# Patient Record
Sex: FEMALE | Race: WHITE | NOT HISPANIC OR LATINO | Employment: OTHER | URBAN - METROPOLITAN AREA
[De-identification: names, ages, dates, MRNs, and addresses within clinical notes are randomized per-mention and may not be internally consistent; named-entity substitution may affect disease eponyms.]

---

## 2021-05-24 ENCOUNTER — RECORDS - HEALTHEAST (OUTPATIENT)
Dept: ADMINISTRATIVE | Facility: CLINIC | Age: 61
End: 2021-05-24

## 2022-10-02 ENCOUNTER — HEALTH MAINTENANCE LETTER (OUTPATIENT)
Age: 62
End: 2022-10-02

## 2022-11-14 NOTE — PROGRESS NOTES
Chief Complaint   Patient presents with     Ent Problem     Patient has symptoms of GERD- hx of this and no improvement      History of Present Illness  Mallika Stuart is a 62 year old female who presents today for evaluation.  The patient reports a long history of issues with throat fullness, throat clearing, postnasal drainage.  She previously saw an ENT and was placed on pantoprazole with some results for a while.  She seems to feel like the problem is getting worse.  She has not seen a gastroenterologist or had an EGD in the past.  She is currently taking 20 mg of pantoprazole once daily 20-30 minutes prior to morning meal.  She is having breakthrough symptoms several times a week requiring Pepcid for treatment.  She does report fairly significant pill dysphagia that is quite bothersome.  She denies any odynophagia, pharyngodynia, otalgia, hemoptysis, voice change, neck lumps/bumps/swelling, no unintentional weight loss.  She is a lifetime non-smoker.  She denies any recent intubations or throat procedures.  She does have a remote history of allergy injection immunotherapy up until about the ninth grade.  She had allergy testing repeated about 12 years ago which was negative.  She does report some known hearing problems more so on the left-hand side.  History of ear tube placement adenoidectomy.    Past Medical History  There is no problem list on file for this patient.    Current Medications     Current Outpatient Medications:      albuterol (PROAIR HFA/PROVENTIL HFA/VENTOLIN HFA) 108 (90 Base) MCG/ACT inhaler, Inhale 2 puffs into the lungs, Disp: , Rfl:      aspirin (ASA) 81 MG EC tablet, Take 81 mg by mouth, Disp: , Rfl:      calcium citrate (CITRACAL) 950 (200 Ca) MG tablet, Take 1,900 mg by mouth, Disp: , Rfl:      famotidine (PEPCID) 40 MG tablet, Take 1 tablet (40 mg) by mouth At Bedtime, Disp: 90 tablet, Rfl: 3     fluticasone-salmeterol (ADVAIR) 250-50 MCG/ACT inhaler, , Disp: , Rfl:      levothyroxine  "(SYNTHROID/LEVOTHROID) 88 MCG tablet, Take 1 tablet by mouth daily, Disp: , Rfl:      metoprolol succinate ER (TOPROL XL) 200 MG 24 hr tablet, Take 200 mg by mouth, Disp: , Rfl:      omeprazole (PRILOSEC) 40 MG DR capsule, Take 1 capsule (40 mg) by mouth daily Take 20-30 minutes prior to morning meal, Disp: 90 capsule, Rfl: 1     rosuvastatin (CRESTOR) 20 MG tablet, Take 20 mg by mouth, Disp: , Rfl:      traZODone (DESYREL) 50 MG tablet, Take 1-2 tablets at bedtime as needed for insomnia, Disp: , Rfl:      Vitamin D3 (CHOLECALCIFEROL) 25 mcg (1000 units) tablet, Take 1 capsule by mouth, Disp: , Rfl:     Allergies  Allergies   Allergen Reactions     Hmg-Coa-R Inhibitors Muscle Pain (Myalgia)       Social History   Social History     Socioeconomic History     Marital status:        Family History  History reviewed. No pertinent family history.    Review of Systems  As per HPI and PMHx, otherwise 10+ comprehensive system review is negative.    Physical Exam  /82 (BP Location: Right arm, Patient Position: Sitting, Cuff Size: Adult Regular)   Pulse 74   Temp 98.5  F (36.9  C) (Tympanic)   Ht 1.626 m (5' 4\")   Wt 76.7 kg (169 lb)   BMI 29.01 kg/m    GENERAL: Patient is a pleasant, cooperative 62 year old female in no acute distress.  HEAD: Normocephalic, atraumatic.  Hair and scalp are normal.  EYES: Pupils are equal, round, reactive to light and accommodation.  Extraocular movements are intact.  The sclera nonicteric without injection.  The extraocular structures are normal.  EARS: See below.  NOSE: Nares are patent.  Nasal mucosa is pink and moist.  Negative anterior rhinoscopy.  ORAL CAVITY: Dentition is in good repair.  Mucous membranes are moist.  Tongue is mobile, protrudes to the midline.  Palate elevates symmetrically.  Small hard palate torus.  Tonsils are 1+, symmetric.  No erythema or exudate.  No oral cavity or oropharyngeal masses, lesions, ulcerations, leukoplakia.  NECK: Supple, trachea is " midline.  There no palpable cervical lymphadenopathy or masses bilaterally.  Palpation of the bilateral parotid and submandibular areas reveal no masses.  No thyromegaly.    NEUROLOGIC: Cranial nerves II through XII are grossly intact.  Voice is strong.  Patient is House-Brackmann I/VI bilaterally.  CARDIOVASCULAR: Extremities are warm and well-perfused.  No significant peripheral edema.  RESPIRATORY: Patient has nonlabored breathing without cough, wheeze, stridor.  PSYCHIATRIC: Patient is alert and oriented.  Mood and affect appear normal.  SKIN: Warm and dry.  No scalp, face, or neck lesions noted.    Physical Exam and Procedure    EARS: Normal shape and symmetry.  No tenderness when palpating the mastoid or tragal areas bilaterally.  The ears were then examined under the otic binocular microscope to perform cerumen removal.  Otoscopic exam on the right reveals a clear canal.  The right tympanic membrane was round, intact without evidence of effusion.  There is a moderate amount of myringosclerosis.  Slight retraction.  No retraction pockets, granulation, drainage, or evidence of cholesteatoma.          Attention was turned to the left ear.  Otoscopic exam on the left reveals a clear canal.  The right tympanic membrane was round, intact without evidence of effusion.  There is a moderate amount of myringosclerosis.  Moderate retraction.  No retraction pockets, granulation, drainage, or evidence of cholesteatoma.         Procedure: Flexible Laryngoscopy   Indication: Globus sensation throat fullness, throat clearing, postnasal drainage    To best visualize the upper airway anatomy and due to the chief complaint and HPI, I proceeded with flexible fiberoptic laryngoscopy examination.  The bilateral nasal cavities were anesthetized and decongested with a mixture of lidocaine and neosynephrine.  The bilateral nasal cavities were examined using a flexible fiberoptic laryngoscope.  There were no nasal cavity masses,  polyps, or mucopurulence bilaterally.  The nasal septum is essentially midline.  The nasopharynx had a normal appearance with normal Eustachian tube openings and fossa of Rosenmuller bilaterally.  Minimal adenoid tissue.  There is a moderate amount of posterior oropharyngeal cobblestoning that extends down to the piriforms.  The base of tongue is generous with lingual tonsil hypertrophy.  The vallecula, epiglottis, aryepiglottic folds, arytenoids, and piriform sinuses were without mass or lesion.  There is a moderate amount of interarytenoid thickening and a mild amount of erythema.  The bilateral true vocal folds were symmetrically mobile without nodules or masses.  The visualized portions of the infraglottic and subglottic airway are unremarkable.  The scope was removed.  The patient tolerated the procedure well.                    Assessment and Plan    ICD-10-CM    1. Laryngopharyngeal reflux  K21.9 LARYNGOSCOPY FLEX FIBEROPTIC, DIAGNOSTIC     Adult General Surg Referral     Speech Therapy Referral     XR Video Swallow with SLP or OT - Order with Speech Therapy Referral     XR Esophagram     omeprazole (PRILOSEC) 40 MG DR capsule     famotidine (PEPCID) 40 MG tablet      2. Chronic throat clearing  R09.89 LARYNGOSCOPY FLEX FIBEROPTIC, DIAGNOSTIC     Adult General Surg Referral     Speech Therapy Referral     XR Video Swallow with SLP or OT - Order with Speech Therapy Referral     XR Esophagram     omeprazole (PRILOSEC) 40 MG DR capsule     famotidine (PEPCID) 40 MG tablet      3. History of gastroesophageal reflux (GERD)  Z87.19 LARYNGOSCOPY FLEX FIBEROPTIC, DIAGNOSTIC     Adult General Surg Referral     Speech Therapy Referral     XR Video Swallow with SLP or OT - Order with Speech Therapy Referral     XR Esophagram     omeprazole (PRILOSEC) 40 MG DR capsule     famotidine (PEPCID) 40 MG tablet      4. History of allergic rhinitis  Z87.09 LARYNGOSCOPY FLEX FIBEROPTIC, DIAGNOSTIC     Adult General Surg Referral      Speech Therapy Referral     XR Video Swallow with SLP or OT - Order with Speech Therapy Referral     XR Esophagram     omeprazole (PRILOSEC) 40 MG DR capsule     famotidine (PEPCID) 40 MG tablet      5. History of placement of ear tubes  Z96.22 Microscopy, Binocular         It was my pleasure seeing Mallika Stuart today in clinic.  The patient presents with globus sensation, throat fullness, throat clearing but no evidence of infection, inflammation, or neoplasm on exam to explain the symptoms. I think the most likely etiology is laryngopharyngeal reflux.  She is currently on 20 mg of pantoprazole but frequently has breakthrough symptoms during the week.  We discussed switching her to 40 mg of omeprazole 20-30 minutes prior to morning meal once daily and 40 mg of Pepcid at bedtime.  We discussed lifestyle changes including avoidance of certain foods, sleeping on an incline, and avoiding lying down within 3-4 hours after eating.     Given her history of dysphagia, I do think a video swallow study with esophagram would be reasonable.  I am also concerned that she might have a hiatal hernia that could be contributing.  Regardless, she might be a candidate for reflux surgery given her long history of symptoms and her age.  I will place referral for surgical evaluation of management of reflux.  She might need an EGD, pH probe testing, etc.      I will send the patient a [employer] message in 6 weeks to check her symptoms.  I will also contact her with the video swallow study results when available.      Talon Sims MD  Department of Otolaryngology-Head and Neck Surgery  [facility]

## 2022-11-16 ENCOUNTER — OFFICE VISIT (OUTPATIENT)
Dept: OTOLARYNGOLOGY | Facility: CLINIC | Age: 62
End: 2022-11-16
Payer: COMMERCIAL

## 2022-11-16 VITALS
DIASTOLIC BLOOD PRESSURE: 82 MMHG | BODY MASS INDEX: 28.85 KG/M2 | SYSTOLIC BLOOD PRESSURE: 133 MMHG | WEIGHT: 169 LBS | TEMPERATURE: 98.5 F | HEIGHT: 64 IN | HEART RATE: 74 BPM

## 2022-11-16 DIAGNOSIS — R09.89 CHRONIC THROAT CLEARING: ICD-10-CM

## 2022-11-16 DIAGNOSIS — K21.9 LARYNGOPHARYNGEAL REFLUX: Primary | ICD-10-CM

## 2022-11-16 DIAGNOSIS — Z96.22 HISTORY OF PLACEMENT OF EAR TUBES: ICD-10-CM

## 2022-11-16 DIAGNOSIS — Z87.09 HISTORY OF ALLERGIC RHINITIS: ICD-10-CM

## 2022-11-16 DIAGNOSIS — Z87.19 HISTORY OF GASTROESOPHAGEAL REFLUX (GERD): ICD-10-CM

## 2022-11-16 PROCEDURE — 92504 EAR MICROSCOPY EXAMINATION: CPT | Performed by: OTOLARYNGOLOGY

## 2022-11-16 PROCEDURE — 31575 DIAGNOSTIC LARYNGOSCOPY: CPT | Performed by: OTOLARYNGOLOGY

## 2022-11-16 PROCEDURE — 99203 OFFICE O/P NEW LOW 30 MIN: CPT | Mod: 25 | Performed by: OTOLARYNGOLOGY

## 2022-11-16 RX ORDER — IBUPROFEN 200 MG
1900 CAPSULE ORAL DAILY
COMMUNITY

## 2022-11-16 RX ORDER — PANTOPRAZOLE SODIUM 20 MG/1
20 TABLET, DELAYED RELEASE ORAL
COMMUNITY
Start: 2022-04-18 | End: 2022-11-16

## 2022-11-16 RX ORDER — TRAZODONE HYDROCHLORIDE 50 MG/1
TABLET, FILM COATED ORAL
COMMUNITY
Start: 2022-04-18

## 2022-11-16 RX ORDER — ALBUTEROL SULFATE 90 UG/1
2 AEROSOL, METERED RESPIRATORY (INHALATION) EVERY 4 HOURS PRN
COMMUNITY
Start: 2022-06-14 | End: 2024-08-29

## 2022-11-16 RX ORDER — OMEPRAZOLE 40 MG/1
40 CAPSULE, DELAYED RELEASE ORAL DAILY
Qty: 90 CAPSULE | Refills: 1 | Status: SHIPPED | OUTPATIENT
Start: 2022-11-16 | End: 2023-05-12

## 2022-11-16 RX ORDER — FAMOTIDINE 40 MG/1
40 TABLET, FILM COATED ORAL AT BEDTIME
Qty: 90 TABLET | Refills: 3 | Status: SHIPPED | OUTPATIENT
Start: 2022-11-16 | End: 2023-05-12

## 2022-11-16 RX ORDER — MULTIVITAMIN WITH IRON
1 TABLET ORAL
COMMUNITY
End: 2022-11-16

## 2022-11-16 RX ORDER — LEVOTHYROXINE SODIUM 88 UG/1
1 TABLET ORAL DAILY
COMMUNITY
Start: 2022-07-20

## 2022-11-16 RX ORDER — METOPROLOL SUCCINATE 200 MG/1
200 TABLET, EXTENDED RELEASE ORAL DAILY
COMMUNITY
Start: 2022-04-18

## 2022-11-16 RX ORDER — FLUTICASONE PROPIONATE AND SALMETEROL 250; 50 UG/1; UG/1
1 POWDER RESPIRATORY (INHALATION) 2 TIMES DAILY
COMMUNITY
Start: 2022-10-10 | End: 2023-06-12

## 2022-11-16 RX ORDER — ROSUVASTATIN CALCIUM 20 MG/1
20 TABLET, COATED ORAL AT BEDTIME
COMMUNITY
Start: 2022-11-11

## 2022-11-16 RX ORDER — VITAMIN B COMPLEX
1 TABLET ORAL DAILY
COMMUNITY

## 2022-11-16 NOTE — PATIENT INSTRUCTIONS
Per physician instructions      If you have questions or concerns on any instructions given to you by your provider today or if you need to schedule an appointment, you can reach us at [phone].

## 2022-11-16 NOTE — LETTER
11/16/2022         RE: Mallika Stuart  [address] [address] [place] [id number]        Dear Colleague,    Thank you for referring your patient, Mallika Stuart, to the [facility]. Please see a copy of my visit note below.    Chief Complaint   Patient presents with     Ent Problem     Patient has symptoms of GERD- hx of this and no improvement      History of Present Illness  Mallika Stuart is a 62 year old female who presents today for evaluation.  The patient reports a long history of issues with throat fullness, throat clearing, postnasal drainage.  She previously saw an ENT and was placed on pantoprazole with some results for a while.  She seems to feel like the problem is getting worse.  She has not seen a gastroenterologist or had an EGD in the past.  She is currently taking 20 mg of pantoprazole once daily 20-30 minutes prior to morning meal.  She is having breakthrough symptoms several times a week requiring Pepcid for treatment.  She does report fairly significant pill dysphagia that is quite bothersome.  She denies any odynophagia, pharyngodynia, otalgia, hemoptysis, voice change, neck lumps/bumps/swelling, no unintentional weight loss.  She is a lifetime non-smoker.  She denies any recent intubations or throat procedures.  She does have a remote history of allergy injection immunotherapy up until about the ninth grade.  She had allergy testing repeated about 12 years ago which was negative.  She does report some known hearing problems more so on the left-hand side.  History of ear tube placement adenoidectomy.    Past Medical History  There is no problem list on file for this patient.    Current Medications     Current Outpatient Medications:      albuterol (PROAIR HFA/PROVENTIL HFA/VENTOLIN HFA) 108 (90 Base) MCG/ACT inhaler, Inhale 2 puffs into the lungs, Disp: , Rfl:      aspirin (ASA) 81 MG EC tablet, Take 81 mg by mouth, Disp: , Rfl:      calcium citrate (CITRACAL) 950 (200 Ca) MG  "tablet, Take 1,900 mg by mouth, Disp: , Rfl:      famotidine (PEPCID) 40 MG tablet, Take 1 tablet (40 mg) by mouth At Bedtime, Disp: 90 tablet, Rfl: 3     fluticasone-salmeterol (ADVAIR) 250-50 MCG/ACT inhaler, , Disp: , Rfl:      levothyroxine (SYNTHROID/LEVOTHROID) 88 MCG tablet, Take 1 tablet by mouth daily, Disp: , Rfl:      metoprolol succinate ER (TOPROL XL) 200 MG 24 hr tablet, Take 200 mg by mouth, Disp: , Rfl:      omeprazole (PRILOSEC) 40 MG DR capsule, Take 1 capsule (40 mg) by mouth daily Take 20-30 minutes prior to morning meal, Disp: 90 capsule, Rfl: 1     rosuvastatin (CRESTOR) 20 MG tablet, Take 20 mg by mouth, Disp: , Rfl:      traZODone (DESYREL) 50 MG tablet, Take 1-2 tablets at bedtime as needed for insomnia, Disp: , Rfl:      Vitamin D3 (CHOLECALCIFEROL) 25 mcg (1000 units) tablet, Take 1 capsule by mouth, Disp: , Rfl:     Allergies  Allergies   Allergen Reactions     Hmg-Coa-R Inhibitors Muscle Pain (Myalgia)       Social History   Social History     Socioeconomic History     Marital status:        Family History  History reviewed. No pertinent family history.    Review of Systems  As per HPI and PMHx, otherwise 10+ comprehensive system review is negative.    Physical Exam  /82 (BP Location: Right arm, Patient Position: Sitting, Cuff Size: Adult Regular)   Pulse 74   Temp 98.5  F (36.9  C) (Tympanic)   Ht 1.626 m (5' 4\")   Wt 76.7 kg (169 lb)   BMI 29.01 kg/m    GENERAL: Patient is a pleasant, cooperative 62 year old female in no acute distress.  HEAD: Normocephalic, atraumatic.  Hair and scalp are normal.  EYES: Pupils are equal, round, reactive to light and accommodation.  Extraocular movements are intact.  The sclera nonicteric without injection.  The extraocular structures are normal.  EARS: See below.  NOSE: Nares are patent.  Nasal mucosa is pink and moist.  Negative anterior rhinoscopy.  ORAL CAVITY: Dentition is in good repair.  Mucous membranes are moist.  Tongue is " mobile, protrudes to the midline.  Palate elevates symmetrically.  Small hard palate torus.  Tonsils are 1+, symmetric.  No erythema or exudate.  No oral cavity or oropharyngeal masses, lesions, ulcerations, leukoplakia.  NECK: Supple, trachea is midline.  There no palpable cervical lymphadenopathy or masses bilaterally.  Palpation of the bilateral parotid and submandibular areas reveal no masses.  No thyromegaly.    NEUROLOGIC: Cranial nerves II through XII are grossly intact.  Voice is strong.  Patient is House-Brackmann I/VI bilaterally.  CARDIOVASCULAR: Extremities are warm and well-perfused.  No significant peripheral edema.  RESPIRATORY: Patient has nonlabored breathing without cough, wheeze, stridor.  PSYCHIATRIC: Patient is alert and oriented.  Mood and affect appear normal.  SKIN: Warm and dry.  No scalp, face, or neck lesions noted.    Physical Exam and Procedure    EARS: Normal shape and symmetry.  No tenderness when palpating the mastoid or tragal areas bilaterally.  The ears were then examined under the otic binocular microscope to perform cerumen removal.  Otoscopic exam on the right reveals a clear canal.  The right tympanic membrane was round, intact without evidence of effusion.  There is a moderate amount of myringosclerosis.  Slight retraction.  No retraction pockets, granulation, drainage, or evidence of cholesteatoma.          Attention was turned to the left ear.  Otoscopic exam on the left reveals a clear canal.  The right tympanic membrane was round, intact without evidence of effusion.  There is a moderate amount of myringosclerosis.  Moderate retraction.  No retraction pockets, granulation, drainage, or evidence of cholesteatoma.         Procedure: Flexible Laryngoscopy   Indication: Globus sensation throat fullness, throat clearing, postnasal drainage    To best visualize the upper airway anatomy and due to the chief complaint and HPI, I proceeded with flexible fiberoptic laryngoscopy  examination.  The bilateral nasal cavities were anesthetized and decongested with a mixture of lidocaine and neosynephrine.  The bilateral nasal cavities were examined using a flexible fiberoptic laryngoscope.  There were no nasal cavity masses, polyps, or mucopurulence bilaterally.  The nasal septum is essentially midline.  The nasopharynx had a normal appearance with normal Eustachian tube openings and fossa of Rosenmuller bilaterally.  Minimal adenoid tissue.  There is a moderate amount of posterior oropharyngeal cobblestoning that extends down to the piriforms.  The base of tongue is generous with lingual tonsil hypertrophy.  The vallecula, epiglottis, aryepiglottic folds, arytenoids, and piriform sinuses were without mass or lesion.  There is a moderate amount of interarytenoid thickening and a mild amount of erythema.  The bilateral true vocal folds were symmetrically mobile without nodules or masses.  The visualized portions of the infraglottic and subglottic airway are unremarkable.  The scope was removed.  The patient tolerated the procedure well.                    Assessment and Plan    ICD-10-CM    1. Laryngopharyngeal reflux  K21.9 LARYNGOSCOPY FLEX FIBEROPTIC, DIAGNOSTIC     Adult General Surg Referral     Speech Therapy Referral     XR Video Swallow with SLP or OT - Order with Speech Therapy Referral     XR Esophagram     omeprazole (PRILOSEC) 40 MG DR capsule     famotidine (PEPCID) 40 MG tablet      2. Chronic throat clearing  R09.89 LARYNGOSCOPY FLEX FIBEROPTIC, DIAGNOSTIC     Adult General Surg Referral     Speech Therapy Referral     XR Video Swallow with SLP or OT - Order with Speech Therapy Referral     XR Esophagram     omeprazole (PRILOSEC) 40 MG DR capsule     famotidine (PEPCID) 40 MG tablet      3. History of gastroesophageal reflux (GERD)  Z87.19 LARYNGOSCOPY FLEX FIBEROPTIC, DIAGNOSTIC     Adult General Surg Referral     Speech Therapy Referral     XR Video Swallow with SLP or OT - Order  with Speech Therapy Referral     XR Esophagram     omeprazole (PRILOSEC) 40 MG DR capsule     famotidine (PEPCID) 40 MG tablet      4. History of allergic rhinitis  Z87.09 LARYNGOSCOPY FLEX FIBEROPTIC, DIAGNOSTIC     Adult General Surg Referral     Speech Therapy Referral     XR Video Swallow with SLP or OT - Order with Speech Therapy Referral     XR Esophagram     omeprazole (PRILOSEC) 40 MG DR capsule     famotidine (PEPCID) 40 MG tablet      5. History of placement of ear tubes  Z96.22 Microscopy, Binocular         It was my pleasure seeing Mallika Stuart today in clinic.  The patient presents with globus sensation, throat fullness, throat clearing but no evidence of infection, inflammation, or neoplasm on exam to explain the symptoms. I think the most likely etiology is laryngopharyngeal reflux.  She is currently on 20 mg of pantoprazole but frequently has breakthrough symptoms during the week.  We discussed switching her to 40 mg of omeprazole 20-30 minutes prior to morning meal once daily and 40 mg of Pepcid at bedtime.  We discussed lifestyle changes including avoidance of certain foods, sleeping on an incline, and avoiding lying down within 3-4 hours after eating.     Given her history of dysphagia, I do think a video swallow study with esophagram would be reasonable.  I am also concerned that she might have a hiatal hernia that could be contributing.  Regardless, she might be a candidate for reflux surgery given her long history of symptoms and her age.  I will place referral for surgical evaluation of management of reflux.  She might need an EGD, pH probe testing, etc.      I will send the patient a [employer] message in 6 weeks to check her symptoms.  I will also contact her with the video swallow study results when available.      Talon Sims MD  Department of Otolaryngology-Head and Neck Surgery  [facility]         Again, thank you for allowing me to participate in the care of your patient.         Sincerely,        Talon Sims MD

## 2022-11-16 NOTE — NURSING NOTE
"Initial /82 (BP Location: Right arm, Patient Position: Sitting, Cuff Size: Adult Regular)   Pulse 74   Temp 98.5  F (36.9  C) (Tympanic)   Ht 1.626 m (5' 4\")   Wt 76.7 kg (169 lb)   BMI 29.01 kg/m   Estimated body mass index is 29.01 kg/m  as calculated from the following:    Height as of this encounter: 1.626 m (5' 4\").    Weight as of this encounter: 76.7 kg (169 lb). .    Carolyn Baum CMA    "

## 2022-12-09 ENCOUNTER — HOSPITAL ENCOUNTER (OUTPATIENT)
Dept: GENERAL RADIOLOGY | Facility: CLINIC | Age: 62
Discharge: HOME | End: 2022-12-09
Attending: OTOLARYNGOLOGY
Payer: COMMERCIAL

## 2022-12-09 ENCOUNTER — HOSPITAL ENCOUNTER (OUTPATIENT)
Dept: SPEECH THERAPY | Facility: CLINIC | Age: 62
Discharge: HOME | End: 2022-12-09
Attending: OTOLARYNGOLOGY
Payer: COMMERCIAL

## 2022-12-09 DIAGNOSIS — Z87.09 HISTORY OF ALLERGIC RHINITIS: ICD-10-CM

## 2022-12-09 DIAGNOSIS — R09.89 CHRONIC THROAT CLEARING: ICD-10-CM

## 2022-12-09 DIAGNOSIS — Z87.19 HISTORY OF GASTROESOPHAGEAL REFLUX (GERD): ICD-10-CM

## 2022-12-09 DIAGNOSIS — K21.9 LARYNGOPHARYNGEAL REFLUX: ICD-10-CM

## 2022-12-09 PROCEDURE — 255N000001 HC RX 255: Performed by: OTOLARYNGOLOGY

## 2022-12-09 PROCEDURE — 74230 X-RAY XM SWLNG FUNCJ C+: CPT

## 2022-12-09 PROCEDURE — 74220 X-RAY XM ESOPHAGUS 1CNTRST: CPT

## 2022-12-09 PROCEDURE — 92611 MOTION FLUOROSCOPY/SWALLOW: CPT | Mod: GN | Performed by: SPEECH-LANGUAGE PATHOLOGIST

## 2022-12-09 RX ORDER — BARIUM SULFATE 400 MG/ML
SUSPENSION ORAL ONCE
Status: COMPLETED | OUTPATIENT
Start: 2022-12-09 | End: 2022-12-09

## 2022-12-09 RX ADMIN — ANTACID/ANTIFLATULENT 4 G: 380; 550; 10; 10 GRANULE, EFFERVESCENT ORAL at 11:36

## 2022-12-09 RX ADMIN — BARIUM SULFATE 50 ML: 400 SUSPENSION ORAL at 11:33

## 2022-12-09 NOTE — PROGRESS NOTES
Impression:  Oral and pharyngeal swallow are WNL.  Pt swallows 2x/sip due to mild oral residue but is not an impairment. No penetration or aspiration across consistencies or pharyngeal residue. Not treatment indicated.  Pt started medication for GERD after ENT visits and states she has noticed some improvement with symptoms.       Video Swallow Study  12/09/22    General Information   Type Of Visit Initial   Start Of Care Date 12/09/22   Referring Physician Talon Sims MD   Orders Evaluate And Treat   Orders Comment Video Swallow   Medical Diagnosis Laryngopharyngeal reflux (K21.9)     Chronic throat clearing (R09.89)     History of gastroesophageal reflux (GERD) (Z87.19)   Onset Of Illness/injury Or Date Of Surgery 11/16/22  (order date)   Precautions/limitations No Known Precautions/limitations   Hearing WFL for exam   Pertinent History of Current Problem/OT: Additional Occupational Profile Info Per ENT visit note: The patient presents with globus sensation, throat fullness, throat clearing but no evidence of infection, inflammation, or neoplasm on exam to explain the symptoms. Given her history of dysphagia, I do think a video swallow study with esophagram would be reasonable.      Today 12/9/22:  Pt started medication for GERD after ENT visits and states she has noticed some improvement with symptoms.   Respiratory Status Room air   Prior Level Of Function Comment Pt eats a regular consistency diet.   Living Environment House/[facility]   Patient/family Goals To complete tests to help MD make diagnosis for neck fullness.   Pain Assessment   Pain Reported No   Fall Risk Screen   Fall screen completed by SLP   Have you fallen 2 or more times in the past year? No   Have you fallen and had an injury in the past year? No   Is patient a fall risk? No   Clinical Swallow Evaluation   Oral Musculature generally intact   Structural Abnormalities none present   Dentition present and adequate   Mucosal Quality good    Mandibular Strength and Mobility intact   Oral Labial Strength and Mobility WFL   Lingual Strength and Mobility WFL   Buccal Strength and Mobility intact   Laryngeal Function Cough   Oral Musculature Comments WNL   VFSS Eval: Radiology   Radiologist Dr. Willis   Views Taken left lateral  (AP during esophagram)   Physical Location of Procedure [facility]   VFSS Eval: Thin Liquid Texture Trial   Mode of Presentation, Thin Liquid cup;straw;self-fed   Order of Presentation 1,2,3   Preparatory Phase WFL   Oral Phase, Thin Liquid WFL   Pharyngeal Phase, Thin Liquid WFL   Rosenbek's Penetration Aspiration Scale: Thin Liquid Trial Results 1 - no aspiration, contrast does not enter airway   Diagnostic Statement [facility]   VFSS Eval: Mildly Thick Liquids    Mode of Presentation cup;self-fed   Order of Presentation 4   Preparatory Phase WFL   Oral Phase WFL   Pharyngeal Phase WFL   Rosenbek's Penetration Aspiration Scale 1 - no aspiration, contrast does not enter airway   Diagnostic Statement [facility]   VFSS Eval: Extremely Thick Liquids    Mode of Presentation spoon;self-fed   Order of Presentation 5   Preparatory Phase WFL   Oral Phase WFL   Pharyngeal Phase WFL   Rosenbek's Penetration Aspiration Scale 1 - no aspiration, contrast does not enter airway   Diagnostic Statement [facility]   VFSS Eval: Regular Texture Trial (Solid)   Mode of Presentation self-fed   Order of Presentation 6   Preparatory Phase WFL   Oral Phase WFL   Pharyngeal Phase WFL   Rosenbek's Penetration Aspiration Scale 1 - no aspiration, contrast does not enter airway   Diagnostic Statement [facility]   Swallow Compensations   Swallow Compensations No compensations were used   Results No difficulties noted  (Pt has spontaneous double/dry swallow to clear)   Educational Assessment   Barriers to Learning No barriers   Esophageal Phase of Swallow   Patient reports or presents with symptoms of esophageal dysphagia Yes   Esophageal sweep performed during today s  vidofluoroscopic exam  Please refer to radiologist's report for details   Esophageal comments AP view during esophagram.  Mild esophageal retention   Swallow Eval: Clinical Impressions   Skilled Criteria for Therapy Intervention No problems identified which require skilled intervention   Functional Assessment Scale (FAS) 7   Dysphagia Outcome Severity Scale (IGNACIO) Level 7 - IGNACIO   Diet texture recommendations Regular diet   Clinical Impression Comments Oral and pharyngeal swallow are WNL.  Pt swallows 2x/sip due to mild oral residue but is not an impairment. No penetration or aspiration across consistencies or pharyngeal residue.   Total Session Time   SLP Eval: VideoFluoroscopic Swallow function Minutes (65452) 25   Total Evaluation Time 25

## 2022-12-14 ENCOUNTER — TELEPHONE (OUTPATIENT)
Dept: OTOLARYNGOLOGY | Facility: CLINIC | Age: 62
End: 2022-12-14

## 2023-01-16 ENCOUNTER — PREP FOR PROCEDURE (OUTPATIENT)
Dept: SURGERY | Facility: CLINIC | Age: 63
End: 2023-01-16

## 2023-01-16 ENCOUNTER — OFFICE VISIT (OUTPATIENT)
Dept: SURGERY | Facility: CLINIC | Age: 63
End: 2023-01-16
Attending: OTOLARYNGOLOGY
Payer: COMMERCIAL

## 2023-01-16 VITALS
SYSTOLIC BLOOD PRESSURE: 116 MMHG | HEIGHT: 64 IN | DIASTOLIC BLOOD PRESSURE: 76 MMHG | BODY MASS INDEX: 28.17 KG/M2 | WEIGHT: 165 LBS

## 2023-01-16 DIAGNOSIS — K21.9 LARYNGOPHARYNGEAL REFLUX: ICD-10-CM

## 2023-01-16 DIAGNOSIS — Z87.09 HISTORY OF ALLERGIC RHINITIS: ICD-10-CM

## 2023-01-16 DIAGNOSIS — K21.9 GASTROESOPHAGEAL REFLUX DISEASE WITHOUT ESOPHAGITIS: Primary | ICD-10-CM

## 2023-01-16 DIAGNOSIS — R09.89 CHRONIC THROAT CLEARING: ICD-10-CM

## 2023-01-16 DIAGNOSIS — Z87.19 HISTORY OF GASTROESOPHAGEAL REFLUX (GERD): ICD-10-CM

## 2023-01-16 PROCEDURE — 99203 OFFICE O/P NEW LOW 30 MIN: CPT | Performed by: SURGERY

## 2023-01-16 RX ORDER — BUPROPION HYDROCHLORIDE 150 MG/1
TABLET ORAL
COMMUNITY
Start: 2022-11-21

## 2023-01-16 NOTE — PROGRESS NOTES
HPI: Mallika Stuart is a 62 year old female referred to see me by Nola Horvath for evaluation of gastroesophageal reflux disease.  She notes  a several year history of gastroesophageal reflux disease with her main complaint of constant throat clearing.  She feels as though she has phlegm buildup which is persistent throughout the entire day.  She does have intermittent nighttime spontaneous regurgitation but denies any nighttime cough.  She has had intermittent mild esophageal burning but is been on PPIs for quite some time.  She denies any dysphagia or other swallowing difficulties.  No hoarse voice or any other symptoms.  Her GERD HR Q OL is 28.  She has seen ENT in the past and underwent video esophagram which showed mild penetration but no other abnormalities.   additionally an esophagram was performed and was relatively unremarkable.    Allergies:Hmg-coa-r inhibitors    No past medical history on file.    No past surgical history on file.    CURRENT MEDS:    Current Outpatient Medications:      albuterol (PROAIR HFA/PROVENTIL HFA/VENTOLIN HFA) 108 (90 Base) MCG/ACT inhaler, Inhale 2 puffs into the lungs, Disp: , Rfl:      aspirin (ASA) 81 MG EC tablet, Take 81 mg by mouth, Disp: , Rfl:      buPROPion (WELLBUTRIN XL) 150 MG 24 hr tablet, Take 1 Tablet (150 mg) by mouth once daily., Disp: , Rfl:      calcium citrate (CITRACAL) 950 (200 Ca) MG tablet, Take 1,900 mg by mouth, Disp: , Rfl:      famotidine (PEPCID) 40 MG tablet, Take 1 tablet (40 mg) by mouth At Bedtime, Disp: 90 tablet, Rfl: 3     fluticasone-salmeterol (ADVAIR) 250-50 MCG/ACT inhaler, , Disp: , Rfl:      levothyroxine (SYNTHROID/LEVOTHROID) 88 MCG tablet, Take 1 tablet by mouth daily, Disp: , Rfl:      metoprolol succinate ER (TOPROL XL) 200 MG 24 hr tablet, Take 200 mg by mouth, Disp: , Rfl:      omeprazole (PRILOSEC) 40 MG DR capsule, Take 1 capsule (40 mg) by mouth daily Take 20-30 minutes prior to morning meal, Disp: 90 capsule, Rfl: 1     " rosuvastatin (CRESTOR) 20 MG tablet, Take 20 mg by mouth, Disp: , Rfl:      traZODone (DESYREL) 50 MG tablet, Take 1-2 tablets at bedtime as needed for insomnia, Disp: , Rfl:      vitamin B-12 (CYANOCOBALAMIN) 1000 MCG tablet, Take 1,000 mcg by mouth, Disp: , Rfl:      Vitamin D3 (CHOLECALCIFEROL) 25 mcg (1000 units) tablet, Take 1 capsule by mouth, Disp: , Rfl:     No family history on file.     reports that she has never smoked. She has never used smokeless tobacco. She reports current alcohol use.    Review of Systems:  The 12 system review is within normal limits except for as mentioned above.  General ROS: No complaints or constitutional symptoms  Ophthalmic ROS: No complaints of visual changes  Skin: No complaints or symptoms   Endocrine: No complaints or symptoms  Hematologic/Lymphatic: No symptoms or complaints  Psychiatric: No symptoms or complaints  Respiratory ROS: no cough, shortness of breath, or wheezing  Cardiovascular ROS: no chest pain or dyspnea on exertion  Gastrointestinal ROS: As per HPI  Genito-Urinary ROS: no dysuria, trouble voiding, or hematuria  Musculoskeletal ROS: no joint or muscle pain  Neurological ROS: no TIA or stroke symptoms      EXAM:  /76   Ht 1.626 m (5' 4\")   Wt 74.8 kg (165 lb)   BMI 28.32 kg/m    GENERAL: Well developed female, No acute distress, pleasant and conversant   EYES: Pupils equal, round and reactive, no scleral icterus  ABDOMEN: Soft, non-tender, no masses, non-distended  SKIN: Pink, warm and dry, no obvious rashes or lesions   NEURO:No focal deficits, ambulatory  MUSCULOSKELETAL:No obvious deformities, no swelling, normal appearing      LABS:  No results found for: WBC, HGB, HCT, MCV, PLT  INR/Prothrombin Time  @LABRCNTIP(NA,K,CL,co2,bun,creatinine,labglom,glucose,calcium)@  No results found for: ALT, AST, GGT, ALKPHOS, BILITOT    IMAGES:   Relevant images were reviewed and discussed with the patient.  Notable findings were: Esophagram and video " esophagram results noted    Assessment/Plan:   Mallika Stuart is a 62 year old female with signs and symptoms consistent with chronic phlegm buildup in the oropharyngeal space which may be secondary to gastroesophageal reflux disease.  I have explained the pathophysiology of GERD.  I in detail as well as the surgical versus non-operative management strategies.      At this point we will proceed with continued initial work-up.  This will include an EGD to establish a baseline.  Risk and benefits of procedure explained and she has consented to proceed.    Mark Rose,  [facility]  [phone]  [facility] Department of Surgery

## 2023-01-16 NOTE — LETTER
1/16/2023         RE: Mallika Stuart  [id number] [place] [address]        Dear Colleague,    Thank you for referring your patient, Mallika Stuart, to the [facility] [place]. Please see a copy of my visit note below.    HPI: Mallika Stuart is a 62 year old female referred to see me by Nola Horvath for evaluation of gastroesophageal reflux disease.  She notes  a several year history of gastroesophageal reflux disease with her main complaint of constant throat clearing.  She feels as though she has phlegm buildup which is persistent throughout the entire day.  She does have intermittent nighttime spontaneous regurgitation but denies any nighttime cough.  She has had intermittent mild esophageal burning but is been on PPIs for quite some time.  She denies any dysphagia or other swallowing difficulties.  No hoarse voice or any other symptoms.  Her GERD HR Q OL is 28.  She has seen ENT in the past and underwent video esophagram which showed mild penetration but no other abnormalities.   additionally an esophagram was performed and was relatively unremarkable.    Allergies:Hmg-coa-r inhibitors    No past medical history on file.    No past surgical history on file.    CURRENT MEDS:    Current Outpatient Medications:      albuterol (PROAIR HFA/PROVENTIL HFA/VENTOLIN HFA) 108 (90 Base) MCG/ACT inhaler, Inhale 2 puffs into the lungs, Disp: , Rfl:      aspirin (ASA) 81 MG EC tablet, Take 81 mg by mouth, Disp: , Rfl:      buPROPion (WELLBUTRIN XL) 150 MG 24 hr tablet, Take 1 Tablet (150 mg) by mouth once daily., Disp: , Rfl:      calcium citrate (CITRACAL) 950 (200 Ca) MG tablet, Take 1,900 mg by mouth, Disp: , Rfl:      famotidine (PEPCID) 40 MG tablet, Take 1 tablet (40 mg) by mouth At Bedtime, Disp: 90 tablet, Rfl: 3     fluticasone-salmeterol (ADVAIR) 250-50 MCG/ACT inhaler, , Disp: , Rfl:      levothyroxine (SYNTHROID/LEVOTHROID) 88 MCG tablet, Take 1 tablet by  "mouth daily, Disp: , Rfl:      metoprolol succinate ER (TOPROL XL) 200 MG 24 hr tablet, Take 200 mg by mouth, Disp: , Rfl:      omeprazole (PRILOSEC) 40 MG DR capsule, Take 1 capsule (40 mg) by mouth daily Take 20-30 minutes prior to morning meal, Disp: 90 capsule, Rfl: 1     rosuvastatin (CRESTOR) 20 MG tablet, Take 20 mg by mouth, Disp: , Rfl:      traZODone (DESYREL) 50 MG tablet, Take 1-2 tablets at bedtime as needed for insomnia, Disp: , Rfl:      vitamin B-12 (CYANOCOBALAMIN) 1000 MCG tablet, Take 1,000 mcg by mouth, Disp: , Rfl:      Vitamin D3 (CHOLECALCIFEROL) 25 mcg (1000 units) tablet, Take 1 capsule by mouth, Disp: , Rfl:     No family history on file.     reports that she has never smoked. She has never used smokeless tobacco. She reports current alcohol use.    Review of Systems:  The 12 system review is within normal limits except for as mentioned above.  General ROS: No complaints or constitutional symptoms  Ophthalmic ROS: No complaints of visual changes  Skin: No complaints or symptoms   Endocrine: No complaints or symptoms  Hematologic/Lymphatic: No symptoms or complaints  Psychiatric: No symptoms or complaints  Respiratory ROS: no cough, shortness of breath, or wheezing  Cardiovascular ROS: no chest pain or dyspnea on exertion  Gastrointestinal ROS: As per HPI  Genito-Urinary ROS: no dysuria, trouble voiding, or hematuria  Musculoskeletal ROS: no joint or muscle pain  Neurological ROS: no TIA or stroke symptoms      EXAM:  /76   Ht 1.626 m (5' 4\")   Wt 74.8 kg (165 lb)   BMI 28.32 kg/m    GENERAL: Well developed female, No acute distress, pleasant and conversant   EYES: Pupils equal, round and reactive, no scleral icterus  ABDOMEN: Soft, non-tender, no masses, non-distended  SKIN: Pink, warm and dry, no obvious rashes or lesions   NEURO:No focal deficits, ambulatory  MUSCULOSKELETAL:No obvious deformities, no swelling, normal appearing      LABS:  No results found for: WBC, HGB, HCT, " MCV, PLT  INR/Prothrombin Time  @LABRCNTIP(NA,K,CL,co2,bun,creatinine,labglom,glucose,calcium)@  No results found for: ALT, AST, GGT, ALKPHOS, BILITOT    IMAGES:   Relevant images were reviewed and discussed with the patient.  Notable findings were: Esophagram and video esophagram results noted    Assessment/Plan:   Mallika Stuart is a 62 year old female with signs and symptoms consistent with chronic phlegm buildup in the oropharyngeal space which may be secondary to gastroesophageal reflux disease.  I have explained the pathophysiology of GERD.  I in detail as well as the surgical versus non-operative management strategies.      At this point we will proceed with continued initial work-up.  This will include an EGD to establish a baseline.  Risk and benefits of procedure explained and she has consented to proceed.    Mark Rose DO [facility]  [phone]  [facility] Department of Surgery      Again, thank you for allowing me to participate in the care of your patient.        Sincerely,        Mark Rose DO

## 2023-01-16 NOTE — H&P (VIEW-ONLY)
HPI: Mallika Stuart is a 62 year old female referred to see me by Nola Horvath for evaluation of gastroesophageal reflux disease.  She notes  a several year history of gastroesophageal reflux disease with her main complaint of constant throat clearing.  She feels as though she has phlegm buildup which is persistent throughout the entire day.  She does have intermittent nighttime spontaneous regurgitation but denies any nighttime cough.  She has had intermittent mild esophageal burning but is been on PPIs for quite some time.  She denies any dysphagia or other swallowing difficulties.  No hoarse voice or any other symptoms.  Her GERD HR Q OL is 28.  She has seen ENT in the past and underwent video esophagram which showed mild penetration but no other abnormalities.   additionally an esophagram was performed and was relatively unremarkable.    Allergies:Hmg-coa-r inhibitors    No past medical history on file.    No past surgical history on file.    CURRENT MEDS:    Current Outpatient Medications:      albuterol (PROAIR HFA/PROVENTIL HFA/VENTOLIN HFA) 108 (90 Base) MCG/ACT inhaler, Inhale 2 puffs into the lungs, Disp: , Rfl:      aspirin (ASA) 81 MG EC tablet, Take 81 mg by mouth, Disp: , Rfl:      buPROPion (WELLBUTRIN XL) 150 MG 24 hr tablet, Take 1 Tablet (150 mg) by mouth once daily., Disp: , Rfl:      calcium citrate (CITRACAL) 950 (200 Ca) MG tablet, Take 1,900 mg by mouth, Disp: , Rfl:      famotidine (PEPCID) 40 MG tablet, Take 1 tablet (40 mg) by mouth At Bedtime, Disp: 90 tablet, Rfl: 3     fluticasone-salmeterol (ADVAIR) 250-50 MCG/ACT inhaler, , Disp: , Rfl:      levothyroxine (SYNTHROID/LEVOTHROID) 88 MCG tablet, Take 1 tablet by mouth daily, Disp: , Rfl:      metoprolol succinate ER (TOPROL XL) 200 MG 24 hr tablet, Take 200 mg by mouth, Disp: , Rfl:      omeprazole (PRILOSEC) 40 MG DR capsule, Take 1 capsule (40 mg) by mouth daily Take 20-30 minutes prior to morning meal, Disp: 90 capsule, Rfl: 1     " rosuvastatin (CRESTOR) 20 MG tablet, Take 20 mg by mouth, Disp: , Rfl:      traZODone (DESYREL) 50 MG tablet, Take 1-2 tablets at bedtime as needed for insomnia, Disp: , Rfl:      vitamin B-12 (CYANOCOBALAMIN) 1000 MCG tablet, Take 1,000 mcg by mouth, Disp: , Rfl:      Vitamin D3 (CHOLECALCIFEROL) 25 mcg (1000 units) tablet, Take 1 capsule by mouth, Disp: , Rfl:     No family history on file.     reports that she has never smoked. She has never used smokeless tobacco. She reports current alcohol use.    Review of Systems:  The 12 system review is within normal limits except for as mentioned above.  General ROS: No complaints or constitutional symptoms  Ophthalmic ROS: No complaints of visual changes  Skin: No complaints or symptoms   Endocrine: No complaints or symptoms  Hematologic/Lymphatic: No symptoms or complaints  Psychiatric: No symptoms or complaints  Respiratory ROS: no cough, shortness of breath, or wheezing  Cardiovascular ROS: no chest pain or dyspnea on exertion  Gastrointestinal ROS: As per HPI  Genito-Urinary ROS: no dysuria, trouble voiding, or hematuria  Musculoskeletal ROS: no joint or muscle pain  Neurological ROS: no TIA or stroke symptoms      EXAM:  /76   Ht 1.626 m (5' 4\")   Wt 74.8 kg (165 lb)   BMI 28.32 kg/m    GENERAL: Well developed female, No acute distress, pleasant and conversant   EYES: Pupils equal, round and reactive, no scleral icterus  ABDOMEN: Soft, non-tender, no masses, non-distended  SKIN: Pink, warm and dry, no obvious rashes or lesions   NEURO:No focal deficits, ambulatory  MUSCULOSKELETAL:No obvious deformities, no swelling, normal appearing      LABS:  No results found for: WBC, HGB, HCT, MCV, PLT  INR/Prothrombin Time  @LABRCNTIP(NA,K,CL,co2,bun,creatinine,labglom,glucose,calcium)@  No results found for: ALT, AST, GGT, ALKPHOS, BILITOT    IMAGES:   Relevant images were reviewed and discussed with the patient.  Notable findings were: Esophagram and video " esophagram results noted    Assessment/Plan:   Mallika Stuart is a 62 year old female with signs and symptoms consistent with chronic phlegm buildup in the oropharyngeal space which may be secondary to gastroesophageal reflux disease.  I have explained the pathophysiology of GERD.  I in detail as well as the surgical versus non-operative management strategies.      At this point we will proceed with continued initial work-up.  This will include an EGD to establish a baseline.  Risk and benefits of procedure explained and she has consented to proceed.    Mark Rose,  [facility]  [phone]  [facility] Department of Surgery

## 2023-01-19 PROBLEM — K21.9 GASTROESOPHAGEAL REFLUX DISEASE WITHOUT ESOPHAGITIS: Status: ACTIVE | Noted: 2023-01-19

## 2023-02-13 ENCOUNTER — ANESTHESIA EVENT (OUTPATIENT)
Dept: SURGERY | Facility: AMBULATORY SURGERY CENTER | Age: 63
End: 2023-02-13
Payer: COMMERCIAL

## 2023-02-14 ENCOUNTER — HOSPITAL ENCOUNTER (OUTPATIENT)
Facility: AMBULATORY SURGERY CENTER | Age: 63
Discharge: HOME | End: 2023-02-14
Attending: SURGERY
Payer: COMMERCIAL

## 2023-02-14 ENCOUNTER — ANESTHESIA (OUTPATIENT)
Dept: SURGERY | Facility: AMBULATORY SURGERY CENTER | Age: 63
End: 2023-02-14
Payer: COMMERCIAL

## 2023-02-14 VITALS
WEIGHT: 165 LBS | HEIGHT: 64 IN | RESPIRATION RATE: 16 BRPM | OXYGEN SATURATION: 100 % | DIASTOLIC BLOOD PRESSURE: 66 MMHG | HEART RATE: 78 BPM | SYSTOLIC BLOOD PRESSURE: 118 MMHG | BODY MASS INDEX: 28.17 KG/M2 | TEMPERATURE: 97.8 F

## 2023-02-14 DIAGNOSIS — K21.9 GASTROESOPHAGEAL REFLUX DISEASE WITHOUT ESOPHAGITIS: ICD-10-CM

## 2023-02-14 PROCEDURE — 43239 EGD BIOPSY SINGLE/MULTIPLE: CPT | Performed by: SURGERY

## 2023-02-14 RX ORDER — SODIUM CHLORIDE, SODIUM LACTATE, POTASSIUM CHLORIDE, CALCIUM CHLORIDE 600; 310; 30; 20 MG/100ML; MG/100ML; MG/100ML; MG/100ML
INJECTION, SOLUTION INTRAVENOUS CONTINUOUS PRN
Status: DISCONTINUED | OUTPATIENT
Start: 2023-02-14 | End: 2023-02-14

## 2023-02-14 RX ORDER — OXYCODONE HYDROCHLORIDE 5 MG/1
5 TABLET ORAL EVERY 4 HOURS PRN
Status: DISCONTINUED | OUTPATIENT
Start: 2023-02-14 | End: 2023-02-16 | Stop reason: HOSPADM

## 2023-02-14 RX ORDER — GLYCOPYRROLATE 0.2 MG/ML
INJECTION, SOLUTION INTRAMUSCULAR; INTRAVENOUS PRN
Status: DISCONTINUED | OUTPATIENT
Start: 2023-02-14 | End: 2023-02-14

## 2023-02-14 RX ORDER — PROPOFOL 10 MG/ML
INJECTION, EMULSION INTRAVENOUS PRN
Status: DISCONTINUED | OUTPATIENT
Start: 2023-02-14 | End: 2023-02-14

## 2023-02-14 RX ORDER — ONDANSETRON 4 MG/1
4 TABLET, ORALLY DISINTEGRATING ORAL EVERY 30 MIN PRN
Status: DISCONTINUED | OUTPATIENT
Start: 2023-02-14 | End: 2023-02-16 | Stop reason: HOSPADM

## 2023-02-14 RX ORDER — OXYCODONE HYDROCHLORIDE 10 MG/1
10 TABLET ORAL EVERY 4 HOURS PRN
Status: DISCONTINUED | OUTPATIENT
Start: 2023-02-14 | End: 2023-02-16 | Stop reason: HOSPADM

## 2023-02-14 RX ORDER — ONDANSETRON 2 MG/ML
4 INJECTION INTRAMUSCULAR; INTRAVENOUS EVERY 30 MIN PRN
Status: DISCONTINUED | OUTPATIENT
Start: 2023-02-14 | End: 2023-02-16 | Stop reason: HOSPADM

## 2023-02-14 RX ORDER — FENTANYL CITRATE 0.05 MG/ML
25 INJECTION, SOLUTION INTRAMUSCULAR; INTRAVENOUS EVERY 5 MIN PRN
Status: DISCONTINUED | OUTPATIENT
Start: 2023-02-14 | End: 2023-02-16 | Stop reason: HOSPADM

## 2023-02-14 RX ORDER — SODIUM CHLORIDE, SODIUM LACTATE, POTASSIUM CHLORIDE, CALCIUM CHLORIDE 600; 310; 30; 20 MG/100ML; MG/100ML; MG/100ML; MG/100ML
INJECTION, SOLUTION INTRAVENOUS CONTINUOUS
Status: DISCONTINUED | OUTPATIENT
Start: 2023-02-14 | End: 2023-02-16 | Stop reason: HOSPADM

## 2023-02-14 RX ORDER — PROPOFOL 10 MG/ML
INJECTION, EMULSION INTRAVENOUS CONTINUOUS PRN
Status: DISCONTINUED | OUTPATIENT
Start: 2023-02-14 | End: 2023-02-14

## 2023-02-14 RX ORDER — LIDOCAINE 40 MG/G
CREAM TOPICAL
Status: DISCONTINUED | OUTPATIENT
Start: 2023-02-14 | End: 2023-02-16 | Stop reason: HOSPADM

## 2023-02-14 RX ORDER — FENTANYL CITRATE 0.05 MG/ML
50 INJECTION, SOLUTION INTRAMUSCULAR; INTRAVENOUS EVERY 5 MIN PRN
Status: DISCONTINUED | OUTPATIENT
Start: 2023-02-14 | End: 2023-02-16 | Stop reason: HOSPADM

## 2023-02-14 RX ADMIN — PROPOFOL 100 MG: 10 INJECTION, EMULSION INTRAVENOUS at 10:13

## 2023-02-14 RX ADMIN — SODIUM CHLORIDE, SODIUM LACTATE, POTASSIUM CHLORIDE, CALCIUM CHLORIDE: 600; 310; 30; 20 INJECTION, SOLUTION INTRAVENOUS at 10:09

## 2023-02-14 RX ADMIN — SODIUM CHLORIDE, SODIUM LACTATE, POTASSIUM CHLORIDE, CALCIUM CHLORIDE: 600; 310; 30; 20 INJECTION, SOLUTION INTRAVENOUS at 09:19

## 2023-02-14 RX ADMIN — GLYCOPYRROLATE 0.2 MG: 0.2 INJECTION, SOLUTION INTRAMUSCULAR; INTRAVENOUS at 10:13

## 2023-02-14 RX ADMIN — PROPOFOL 160 MCG/KG/MIN: 10 INJECTION, EMULSION INTRAVENOUS at 10:13

## 2023-02-14 ASSESSMENT — ENCOUNTER SYMPTOMS
SEIZURES: 0
DYSRHYTHMIAS: 0

## 2023-02-14 ASSESSMENT — COPD QUESTIONNAIRES: COPD: 0

## 2023-02-14 ASSESSMENT — LIFESTYLE VARIABLES: TOBACCO_USE: 0

## 2023-02-14 NOTE — ANESTHESIA POSTPROCEDURE EVALUATION
Patient: Mallika Stuart    Procedure: Procedure(s):  ESOPHAGOGASTRODUODENOSCOPY, WITH BIOPSY       Anesthesia Type:  MAC    Note:  Disposition: Outpatient   Postop Pain Control: Uneventful            Sign Out: Well controlled pain   PONV: No   Neuro/Psych: Uneventful            Sign Out: Acceptable/Baseline neuro status   Airway/Respiratory: Uneventful            Sign Out: Acceptable/Baseline resp. status   CV/Hemodynamics: Uneventful            Sign Out: Acceptable CV status; No obvious hypovolemia; No obvious fluid overload   Other NRE: NONE   DID A NON-ROUTINE EVENT OCCUR? No           Last vitals:  Vitals Value Taken Time   /66 02/14/23 1100   Temp 97.8  F (36.6  C) 02/14/23 1030   Pulse 79 02/14/23 1103   Resp 16 02/14/23 1100   SpO2 100 % 02/14/23 1103   Vitals shown include unvalidated device data.    Electronically Signed By: Maria Del Rosario Noyola MD  February 14, 2023  11:48 AM

## 2023-02-14 NOTE — OP NOTE
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  A bite block was placed and the endoscope was advanced in the oropharynx.  The scope was advanced into the second portion of duodenum without difficulty.  The duodenum appeared grossly normal.  The scope was slowly withdrawn and the antrum appeared to demonstrate mild gastritis.  This was biopsied with cold forcep biopsy..  I then retroflexed the endoscope and noted a Hill grade 3 gastroesophageal flap valve with a visible 3 cm hiatal hernia.  I then withdrew the scope to the GE junction.  The Z-line measured approximately at 40 cm.  Cold forcep biopsies were taken of the GE junction and distal esophagus prior to withdrawal of the scope.  There was mildesophagitis once the evaluation was complete I then withdrew the scope slowly.  The esophagus appeared grossly normal.  The Scope was eventually withdrawn, the bite block was removed and the patient was brought sedation and sent to the PACU to undergo recovery.    Estimated Blood Loss:   0    Specimens:    ID Type Source Tests Collected by Time Destination   1 :  Tissue Stomach, Antrum SURGICAL PATHOLOGY EXAM Mark Rose DO 2/14/2023 10:17 AM    2 :  Tissue Gastric Esophageal Junction SURGICAL PATHOLOGY EXAM Mark Rose DO 2/14/2023 10:18 AM    3 :  Tissue Esophagus, Distal SURGICAL PATHOLOGY EXAM Mark Rose  DO Chi 2/14/2023 10:20 AM           Drains:        Complications:    None    Mark Rose DO

## 2023-02-14 NOTE — DISCHARGE INSTRUCTIONS
Discharge Instructions:    Take you medications as directed and follow up with you primary provider as scheduled.   You should expect to pass air from your rectum after the procedure.   Follow these care guidelines during your recovery for the next 24 hours.   If you have any questions or concerns please contact the provider that performed your procedure.     You were given medicine for sedation:  You have been given medicines during your procedure that might make you sleepy and weak. To prevent problems:    *Rest for the rest of the day after you are home. You should be back to your normal activity tomorrow.  *For the next 24 hours:   -Do not drink alcoholic beverages.   -Do not make any important decisions or sign any legal forms.   -Do not work around machinery or power equipment.     The medicines used for sedation may make you feel nauseated.   *Start with clear liquids, such as tea, jell-o, broth and ginger ale. As you feel better you may add soft foods such as pudding and ice cream.   *When you no longer feel nauseated, you may try your normal diet.     You should be back to eating your normal after 24 hours.     Call if you have any of these problems:  *Fever of 101 degree F or 38 degrees C  *Nausea with vomiting that does not ease after a few hours.  *Abdominal pain or bloating  *Fainting

## 2023-02-14 NOTE — ANESTHESIA CARE TRANSFER NOTE
Patient: Mallika Stuart    Procedure: Procedure(s):  ESOPHAGOGASTRODUODENOSCOPY, WITH BIOPSY       Diagnosis: Gastroesophageal reflux disease without esophagitis [K21.9]  Diagnosis Additional Information: No value filed.    Anesthesia Type:   MAC     Note:    Oropharynx: oropharynx clear of all foreign objects  Level of Consciousness: awake and drowsy      Independent Airway: airway patency satisfactory and stable  Dentition: dentition unchanged  Vital Signs Stable: post-procedure vital signs reviewed and stable  Report to RN Given: handoff report given  Patient transferred to: Phase II    Handoff Report: Identifed the Patient, Identified the Reponsible Provider, Reviewed the pertinent medical history, Discussed the surgical course, Reviewed Intra-OP anesthesia mangement and issues during anesthesia, Set expectations for post-procedure period and Allowed opportunity for questions and acknowledgement of understanding      Vitals:  Vitals Value Taken Time   /58 02/14/23 1030   Temp 97.8  F (36.6  C) 02/14/23 1030   Pulse 82 02/14/23 1030   Resp 16 02/14/23 1030   SpO2 98 % 02/14/23 1030       Electronically Signed By: JANE Batista CRNA  February 14, 2023  10:33 AM

## 2023-02-14 NOTE — ANESTHESIA PREPROCEDURE EVALUATION
Anesthesia Pre-Procedure Evaluation    Patient: Mallika Stuart   MRN: [id number] : 1960        Procedure : Procedure(s):  ESOPHAGOGASTRODUODENOSCOPY, WITH BIOPSY          Past Medical History:   Diagnosis Date     Arrhythmia      Difficulty walking      Gastroesophageal reflux disease      Heart murmur      Irregular heart beat      PONV (postoperative nausea and vomiting)      Thyroid disease       History reviewed. No pertinent surgical history.   Allergies   Allergen Reactions     Hmg-Coa-R Inhibitors Muscle Pain (Myalgia)      Social History     Tobacco Use     Smoking status: Never     Smokeless tobacco: Never   Substance Use Topics     Alcohol use: Yes     Comment: occas.      Wt Readings from Last 1 Encounters:   23 74.8 kg (165 lb)        Anesthesia Evaluation   Pt has had prior anesthetic.     History of anesthetic complications  - PONV.      ROS/MED HX  ENT/Pulmonary:    (-) tobacco use, asthma, COPD, sleep apnea, UGO risk factors and recent URI   Neurologic:    (-) no seizures and no CVA   Cardiovascular: Comment: Summary     1. Echo  2021 9:18:00 AM.     2. Normal LV systolic function, EF 55%. No regional wall motion   abnormalities.     3. The right ventricular size is normal and systolic function is normal.     4. Mild LA dilatation.     5. There is trace aortic valve regurgitation.     6. Thickened mitral valve leaflets with rheumatic appearance. There is   moderate mitral stenosis with mean gradient of 7mmhg at HR of 70 BPM.     7. There is mild mitral valve regurgitation.     8. Compared to 2019, minimal increase in MV gradient, previously 6mmHg at    HR   62bpm.       (+) -----valvular problems/murmurs mitral stenosis .  (-) hypertension, taking anticoagulants/antiplatelets, syncope and arrhythmias   METS/Exercise Tolerance: >4 METS    Hematologic:    (-) anemia   Musculoskeletal:       GI/Hepatic:     (+) GERD,     Renal/Genitourinary:    (-) renal disease   Endo:     (+) thyroid  problem, hypothyroidism,  (-) Type I DM, Type II DM and obesity   Psychiatric/Substance Use:    (-) chronic opioid use history   Infectious Disease:    (-) Recent Fever   Malignancy:       Other:            Physical Exam    Airway        Mallampati: II   TM distance: > 3 FB   Neck ROM: full   Mouth opening: > 3 cm    Respiratory Devices and Support         Dental     Comment: Fair, no loose or removable teeth        Cardiovascular          Rhythm and rate: regular and normal     Pulmonary           breath sounds clear to auscultation           OUTSIDE LABS:  CBC: No results found for: WBC, HGB, HCT, PLT  BMP: No results found for: NA, POTASSIUM, CHLORIDE, CO2, BUN, CR, GLC  COAGS: No results found for: PTT, INR, FIBR  POC: No results found for: BGM, HCG, HCGS  HEPATIC: No results found for: ALBUMIN, PROTTOTAL, ALT, AST, GGT, ALKPHOS, BILITOTAL, BILIDIRECT, RAMESH  OTHER: No results found for: PH, LACT, A1C, EDMOND, PHOS, MAG, LIPASE, AMYLASE, TSH, T4, T3, CRP, SED    Anesthesia Plan    ASA Status:  2   NPO Status:  NPO Appropriate    Anesthesia Type: MAC.              Consents    Anesthesia Plan(s) and associated risks, benefits, and realistic alternatives discussed. Questions answered and patient/representative(s) expressed understanding.    - Discussed:     - Discussed with:  Patient, Spouse      - Extended Intubation/Ventilatory Support Discussed: No.      - Patient is DNR/DNI Status: No    Use of blood products discussed: No .     Postoperative Care            Comments:    Other Comments: Propofol gtt only             Maria Del Rosario Noyola MD

## 2023-02-14 NOTE — INTERVAL H&P NOTE
"I have reviewed the surgical (or preoperative) H&P that is linked to this encounter, and examined the patient. There are no significant changes    Clinical Conditions Present on Arrival:  Clinically Significant Risk Factors Present on Admission                    # Overweight: Estimated body mass index is 28.32 kg/m  as calculated from the following:    Height as of this encounter: 1.626 m (5' 4\").    Weight as of this encounter: 74.8 kg (165 lb).     Lungs- clear to auscultation bilaterally  CV- regular rate and rhythm    Plan for egd    Demetri Rose [facility]  [phone]    "

## 2023-02-20 ENCOUNTER — VIRTUAL VISIT (OUTPATIENT)
Dept: SURGERY | Facility: CLINIC | Age: 63
End: 2023-02-20
Payer: COMMERCIAL

## 2023-02-20 DIAGNOSIS — K44.9 HIATAL HERNIA: Primary | ICD-10-CM

## 2023-02-20 PROCEDURE — 99212 OFFICE O/P EST SF 10 MIN: CPT | Mod: 95 | Performed by: SURGERY

## 2023-02-20 NOTE — LETTER
"    2/20/2023         RE: Mallika Stuart  [id number] [address] [place] [id number]        Dear Colleague,    Thank you for referring your patient, Mallika Stuart, to the [facility]. Please see a copy of my visit note below.      The patient has been notified of following:     \"This telephone visit will be conducted via a call between you and your physician/provider. We have found that certain health care needs can be provided without the need for a physical exam.  This service lets us provide the care you need with a short phone conversation.  If a prescription is necessary we can send it directly to your pharmacy.  If lab work is needed we can place an order for that and you can then stop by our lab to have the test done at a later time.    Telephone visits are billed at different rates depending on your insurance coverage. During this emergency period, for some insurers they may be billed the same as an in-person visit.  Please reach out to your insurance provider with any questions.    If during the course of the call the physician/provider feels a telephone visit is not appropriate, you will not be charged for this service.\"    Patient has given verbal consent to a Telephone visit? Yes    What phone number would you like to be contacted at? [phone]    Patient would like to receive their AVS by [employer]          Distant Location (provider location):  On-site    Additional provider notes: I spoke with Mrs. Stuart regarding the endoscopy and the biopsy results.  We discussed surgical versus nonoperative management strategy of her 3 cm hiatal hernia.  She has been on long-term medications and states that they are not helping with her symptomatology which is not unexpected given her mechanical hernia defect.  We discussed surgical risks and benefits in detail.  At this point she like to consider her options and will follow-up in clinic via [employer] or phone call if she " would like to pursue the surgical option.    Phone call duration: 15 minutes    Demetri Rose DO [facility]  [phone]          Again, thank you for allowing me to participate in the care of your patient.        Sincerely,        Mark Rose, DO

## 2023-02-20 NOTE — PROGRESS NOTES
"  The patient has been notified of following:     \"This telephone visit will be conducted via a call between you and your physician/provider. We have found that certain health care needs can be provided without the need for a physical exam.  This service lets us provide the care you need with a short phone conversation.  If a prescription is necessary we can send it directly to your pharmacy.  If lab work is needed we can place an order for that and you can then stop by our lab to have the test done at a later time.    Telephone visits are billed at different rates depending on your insurance coverage. During this emergency period, for some insurers they may be billed the same as an in-person visit.  Please reach out to your insurance provider with any questions.    If during the course of the call the physician/provider feels a telephone visit is not appropriate, you will not be charged for this service.\"    Patient has given verbal consent to a Telephone visit? Yes    What phone number would you like to be contacted at? [phone]    [facility]          Distant Location (provider location):  On-site    Additional provider notes: I spoke with Mrs. Stuart regarding the endoscopy and the biopsy results.  We discussed surgical versus nonoperative management strategy of her 3 cm hiatal hernia.  She has been on long-term medications and states that they are not helping with her symptomatology which is not unexpected given her mechanical hernia defect.  We discussed surgical risks and benefits in detail.  At this point she like to consider her options and will follow-up in clinic via MyChart or phone call if she would like to pursue the surgical option.    Phone call duration: 15 minutes    Demetri Rose DO [facility] Surgery  [phone]      "

## 2023-02-23 ENCOUNTER — PREP FOR PROCEDURE (OUTPATIENT)
Dept: SURGERY | Facility: CLINIC | Age: 63
End: 2023-02-23
Payer: COMMERCIAL

## 2023-02-23 DIAGNOSIS — K44.9 HIATAL HERNIA: Primary | ICD-10-CM

## 2023-02-28 ENCOUNTER — TELEPHONE (OUTPATIENT)
Dept: SURGERY | Facility: CLINIC | Age: 63
End: 2023-02-28
Payer: COMMERCIAL

## 2023-02-28 NOTE — TELEPHONE ENCOUNTER
Spoke with patient today regarding surgery scheduling     Went over details/instructions.    Surgery Letter sent via [employer]  (Please see LETTERS TAB in chart to retrieve a copy of this letter)

## 2023-02-28 NOTE — LETTER
We've received instruction to get you scheduled for surgery with Dr Rose. We have that arranged as follows:     Pre-op Physical:  Call your primary clinic to schedule.    Surgery Date: 5/26/2023     Location: [facility] [address] [id number]    Approximate Arrival Time: 5:00 am  (Unless instructed differently by the pre-op call nurse)     Post op Appointment: 6/14/2023 at 8:00 am with      [id number] [id number]    Prep Tasks and Info:     1. A pre-op physical with your primary care doctor is required before surgery. This must be 10-30 days before surgery.  Since it required by anesthesia, your surgery will be cancelled if it's not done. Call your clinic asap to get this scheduled.    2. Review your medications with your primary care or prescribing physician; they will advise you which meds to stop and when, and when you can resume taking.  Certain medications like blood thinners need to be stopped in advance of surgery to proceed safely.    3. Please shower the evening before and morning of surgery with Hibiclens soap.  This can be found at your local pharmacy. Follow the links below for detailed instructions for preoperative skin preparation:    Showering Before [id number]     Preparing Your Skin Before Surgery - When you can't take a [id number]     4. Fasting instructions will be provided by the pre-op nurse who will call you 1-3 days before surgery.  Typically we advise normal food up to 8 hours before you arrive for surgery. Clear liquids only from then until 2 hours before you arrive surgery then nothing at all by mouth.       5. Smoking impacts your body's ability to heal properly.  If you are a smoker, we strongly urge you to stop smoking 4-6 weeks before surgery. Plastic surgery patients are required to be nicotine free for 8 weeks before surgery.     6. You will need an adult to  drive you home and stay with you 24 hours after surgery. Public transportation or Medical Van Services are not permitted.    7. Visitor restrictions are subject to change, please verify how many people can accompany you with the pre-op nurse when they call.    8. We always encourage you to notify your insurance any time you have medical tests or procedures scheduled including surgery. The number is usually right on the back of your insurance card. Please call [facility] Cost of Care at [phone] if you'd like a surgery quote.     Preparing for Your Surgery [id number]     Call our office if you have any questions! Thank you!

## 2023-03-06 ENCOUNTER — ANCILLARY PROCEDURE (OUTPATIENT)
Dept: MAMMOGRAPHY | Facility: CLINIC | Age: 63
End: 2023-03-06
Attending: FAMILY MEDICINE
Payer: COMMERCIAL

## 2023-03-06 DIAGNOSIS — Z12.31 VISIT FOR SCREENING MAMMOGRAM: ICD-10-CM

## 2023-03-06 PROCEDURE — 77067 SCR MAMMO BI INCL CAD: CPT | Mod: TC | Performed by: STUDENT IN AN ORGANIZED HEALTH CARE EDUCATION/TRAINING PROGRAM

## 2023-03-06 PROCEDURE — 77063 BREAST TOMOSYNTHESIS BI: CPT | Mod: TC | Performed by: STUDENT IN AN ORGANIZED HEALTH CARE EDUCATION/TRAINING PROGRAM

## 2023-03-14 ENCOUNTER — TELEPHONE (OUTPATIENT)
Dept: SURGERY | Facility: CLINIC | Age: 63
End: 2023-03-14
Payer: COMMERCIAL

## 2023-03-14 NOTE — TELEPHONE ENCOUNTER
Spoke with Mallika today and offered her to move up from 5/26 to 4/5 with Dr. Rose.   Patient stated she would love to move up sooner but her brother will be in town that week so she can't take it at this time.

## 2023-04-04 ENCOUNTER — APPOINTMENT (OUTPATIENT)
Dept: CT IMAGING | Facility: CLINIC | Age: 63
End: 2023-04-04
Attending: FAMILY MEDICINE
Payer: COMMERCIAL

## 2023-04-04 ENCOUNTER — HOSPITAL ENCOUNTER (EMERGENCY)
Facility: CLINIC | Age: 63
Discharge: HOME | End: 2023-04-04
Attending: FAMILY MEDICINE | Admitting: FAMILY MEDICINE
Payer: COMMERCIAL

## 2023-04-04 VITALS
HEART RATE: 73 BPM | OXYGEN SATURATION: 100 % | RESPIRATION RATE: 13 BRPM | DIASTOLIC BLOOD PRESSURE: 84 MMHG | BODY MASS INDEX: 28.68 KG/M2 | WEIGHT: 168 LBS | SYSTOLIC BLOOD PRESSURE: 166 MMHG | HEIGHT: 64 IN | TEMPERATURE: 97.7 F

## 2023-04-04 DIAGNOSIS — R00.2 PALPITATIONS: ICD-10-CM

## 2023-04-04 LAB
ALBUMIN SERPL BCG-MCNC: 4.9 G/DL (ref 3.5–5.2)
ALP SERPL-CCNC: 78 U/L (ref 35–104)
ALT SERPL W P-5'-P-CCNC: 32 U/L (ref 10–35)
ANION GAP SERPL CALCULATED.3IONS-SCNC: 14 MMOL/L (ref 7–15)
AST SERPL W P-5'-P-CCNC: 32 U/L (ref 10–35)
BASOPHILS # BLD AUTO: 0 10E3/UL (ref 0–0.2)
BASOPHILS NFR BLD AUTO: 1 %
BILIRUB SERPL-MCNC: 0.8 MG/DL
BUN SERPL-MCNC: 9.8 MG/DL (ref 8–23)
CALCIUM SERPL-MCNC: 10.4 MG/DL (ref 8.8–10.2)
CHLORIDE SERPL-SCNC: 102 MMOL/L (ref 98–107)
CREAT SERPL-MCNC: 0.74 MG/DL (ref 0.51–0.95)
DEPRECATED HCO3 PLAS-SCNC: 24 MMOL/L (ref 22–29)
EOSINOPHIL # BLD AUTO: 0.1 10E3/UL (ref 0–0.7)
EOSINOPHIL NFR BLD AUTO: 1 %
ERYTHROCYTE [DISTWIDTH] IN BLOOD BY AUTOMATED COUNT: 13.3 % (ref 10–15)
GFR SERPL CREATININE-BSD FRML MDRD: 90 ML/MIN/1.73M2
GLUCOSE SERPL-MCNC: 112 MG/DL (ref 70–99)
HCT VFR BLD AUTO: 42.9 % (ref 35–47)
HGB BLD-MCNC: 14.4 G/DL (ref 11.7–15.7)
IMM GRANULOCYTES # BLD: 0 10E3/UL
IMM GRANULOCYTES NFR BLD: 0 %
LYMPHOCYTES # BLD AUTO: 1.7 10E3/UL (ref 0.8–5.3)
LYMPHOCYTES NFR BLD AUTO: 30 %
MCH RBC QN AUTO: 30.1 PG (ref 26.5–33)
MCHC RBC AUTO-ENTMCNC: 33.6 G/DL (ref 31.5–36.5)
MCV RBC AUTO: 90 FL (ref 78–100)
MONOCYTES # BLD AUTO: 0.6 10E3/UL (ref 0–1.3)
MONOCYTES NFR BLD AUTO: 10 %
NEUTROPHILS # BLD AUTO: 3.3 10E3/UL (ref 1.6–8.3)
NEUTROPHILS NFR BLD AUTO: 58 %
NRBC # BLD AUTO: 0 10E3/UL
NRBC BLD AUTO-RTO: 0 /100
PLATELET # BLD AUTO: 233 10E3/UL (ref 150–450)
POTASSIUM SERPL-SCNC: 4 MMOL/L (ref 3.4–5.3)
PROT SERPL-MCNC: 8.3 G/DL (ref 6.4–8.3)
RBC # BLD AUTO: 4.79 10E6/UL (ref 3.8–5.2)
SODIUM SERPL-SCNC: 140 MMOL/L (ref 136–145)
TROPONIN T SERPL HS-MCNC: 10 NG/L
TSH SERPL DL<=0.005 MIU/L-ACNC: 2.64 UIU/ML (ref 0.3–4.2)
WBC # BLD AUTO: 5.6 10E3/UL (ref 4–11)

## 2023-04-04 PROCEDURE — 93010 ELECTROCARDIOGRAM REPORT: CPT | Performed by: FAMILY MEDICINE

## 2023-04-04 PROCEDURE — 258N000003 HC RX IP 258 OP 636: Performed by: FAMILY MEDICINE

## 2023-04-04 PROCEDURE — 96360 HYDRATION IV INFUSION INIT: CPT | Mod: 59 | Performed by: FAMILY MEDICINE

## 2023-04-04 PROCEDURE — 71275 CT ANGIOGRAPHY CHEST: CPT

## 2023-04-04 PROCEDURE — 99285 EMERGENCY DEPT VISIT HI MDM: CPT | Mod: 25 | Performed by: FAMILY MEDICINE

## 2023-04-04 PROCEDURE — 93005 ELECTROCARDIOGRAM TRACING: CPT | Performed by: FAMILY MEDICINE

## 2023-04-04 PROCEDURE — 80053 COMPREHEN METABOLIC PANEL: CPT | Performed by: FAMILY MEDICINE

## 2023-04-04 PROCEDURE — 250N000011 HC RX IP 250 OP 636: Performed by: FAMILY MEDICINE

## 2023-04-04 PROCEDURE — 84484 ASSAY OF TROPONIN QUANT: CPT | Performed by: FAMILY MEDICINE

## 2023-04-04 PROCEDURE — 85004 AUTOMATED DIFF WBC COUNT: CPT | Performed by: FAMILY MEDICINE

## 2023-04-04 PROCEDURE — 84443 ASSAY THYROID STIM HORMONE: CPT | Performed by: FAMILY MEDICINE

## 2023-04-04 PROCEDURE — 250N000009 HC RX 250: Performed by: FAMILY MEDICINE

## 2023-04-04 PROCEDURE — 36415 COLL VENOUS BLD VENIPUNCTURE: CPT | Performed by: FAMILY MEDICINE

## 2023-04-04 PROCEDURE — 99284 EMERGENCY DEPT VISIT MOD MDM: CPT | Mod: 25 | Performed by: FAMILY MEDICINE

## 2023-04-04 PROCEDURE — 96361 HYDRATE IV INFUSION ADD-ON: CPT | Performed by: FAMILY MEDICINE

## 2023-04-04 RX ORDER — IOPAMIDOL 755 MG/ML
73 INJECTION, SOLUTION INTRAVASCULAR ONCE
Status: DISCONTINUED | OUTPATIENT
Start: 2023-04-04 | End: 2023-04-04

## 2023-04-04 RX ORDER — IOPAMIDOL 755 MG/ML
62 INJECTION, SOLUTION INTRAVASCULAR ONCE
Status: COMPLETED | OUTPATIENT
Start: 2023-04-04 | End: 2023-04-04

## 2023-04-04 RX ADMIN — SODIUM CHLORIDE 100 ML: 9 INJECTION, SOLUTION INTRAVENOUS at 12:09

## 2023-04-04 RX ADMIN — IOPAMIDOL 62 ML: 755 INJECTION, SOLUTION INTRAVENOUS at 12:09

## 2023-04-04 RX ADMIN — SODIUM CHLORIDE 1000 ML: 9 INJECTION, SOLUTION INTRAVENOUS at 11:14

## 2023-04-04 ASSESSMENT — ACTIVITIES OF DAILY LIVING (ADL)
ADLS_ACUITY_SCORE: 35
ADLS_ACUITY_SCORE: 33

## 2023-04-04 NOTE — ED PROVIDER NOTES
HPI   Patient is a 62-year-old female presenting with intermittent tachycardia/palpitations and shortness of breath.  She denies significant lung disease.  She does not take albuterol on a regular basis.  She does have coronary artery disease as diagnosed on angiography but she denies stenting or coronary bypass.  She does have high blood pressure and she takes Toprol 200 mg daily.  She does have hypothyroidism and she takes Synthroid.  She has a known history of moderate mitral valve stenosis and trace mitral regurgitation as diagnosed by echocardiography on 8/9/2021.  Her EF was 55%.  There was no other significant abnormalities.  She follows with Dr. Barrientos at [facility], cardiology.    Patient recognizes intermittent racing heart since Friday, 4 days ago.  She will occasionally have it when sitting still but she definitely appreciates it more when she is up and moving.  She also describes new shortness of breath when she is up and moving, specifically when going up stairs or walking quickly.  She denies chest pain, back, jaw, neck, or arm pain.  No cough or congestion that is new or different.  No leg pain or swelling.  No skin rash.  No fever.        Allergies:  Allergies   Allergen Reactions     Hmg-Coa-R Inhibitors Muscle Pain (Myalgia)     Problem List:    Patient Active Problem List    Diagnosis Date Noted     Gastroesophageal reflux disease without esophagitis 01/19/2023     Priority: Medium     Added automatically from request for surgery [id number]        Past Medical History:    Past Medical History:   Diagnosis Date     Arrhythmia      Difficulty walking      Gastroesophageal reflux disease      Gastroesophageal reflux disease without esophagitis 1/19/2023     Heart murmur      Irregular heart beat      PONV (postoperative nausea and vomiting)      Thyroid disease      Past Surgical History:    Past Surgical History:   Procedure Laterality Date     ESOPHAGOSCOPY, GASTROSCOPY, DUODENOSCOPY (EGD),  "COMBINED N/A 2/14/2023    Procedure: ESOPHAGOGASTRODUODENOSCOPY, WITH BIOPSY;  Surgeon: Mark Rose DO;  Location: [facility]     Family History:    No family history on file.  Social History:  Marital Status:   [2]  Social History     Tobacco Use     Smoking status: Never     Smokeless tobacco: Never   Substance Use Topics     Alcohol use: Yes     Comment: occas.      Medications:    albuterol (PROAIR HFA/PROVENTIL HFA/VENTOLIN HFA) 108 (90 Base) MCG/ACT inhaler  aspirin (ASA) 81 MG EC tablet  buPROPion (WELLBUTRIN XL) 150 MG 24 hr tablet  calcium citrate (CITRACAL) 950 (200 Ca) MG tablet  famotidine (PEPCID) 40 MG tablet  fluticasone-salmeterol (ADVAIR) 250-50 MCG/ACT inhaler  levothyroxine (SYNTHROID/LEVOTHROID) 88 MCG tablet  metoprolol succinate ER (TOPROL XL) 200 MG 24 hr tablet  omeprazole (PRILOSEC) 40 MG DR capsule  rosuvastatin (CRESTOR) 20 MG tablet  traZODone (DESYREL) 50 MG tablet  vitamin B-12 (CYANOCOBALAMIN) 1000 MCG tablet  Vitamin D3 (CHOLECALCIFEROL) 25 mcg (1000 units) tablet      Review of Systems   All other systems reviewed and are negative.      PE   BP: (!) 159/96  Pulse: 94  Temp: 97.7  F (36.5  C)  Resp: 16  Height: 162.6 cm (5' 4\")  Weight: 76.2 kg (168 lb)  SpO2: 100 %  Physical Exam  Vitals reviewed.   Constitutional:       General: She is not in acute distress.     Appearance: She is well-developed.   HENT:      Head: Normocephalic and atraumatic.      Right Ear: External ear normal.      Left Ear: External ear normal.      Nose: Nose normal.      Mouth/Throat:      Mouth: Mucous membranes are moist.      Pharynx: Oropharynx is clear.   Eyes:      Extraocular Movements: Extraocular movements intact.      Conjunctiva/sclera: Conjunctivae normal.      Pupils: Pupils are equal, round, and reactive to light.   Cardiovascular:      Rate and Rhythm: Normal rate and regular rhythm.      Heart sounds: Normal heart sounds.   Pulmonary:      Effort: Pulmonary effort is " normal.      Breath sounds: Normal breath sounds.   Abdominal:      Palpations: Abdomen is soft.      Tenderness: There is no abdominal tenderness.   Musculoskeletal:         General: No swelling or tenderness. Normal range of motion.      Cervical back: Normal range of motion.      Right lower leg: No edema.      Left lower leg: No edema.   Skin:     General: Skin is warm and dry.   Neurological:      Mental Status: She is alert and oriented to person, place, and time.   Psychiatric:         Behavior: Behavior normal.         ED COURSE and MDM   1156.  Patient has symptoms and signs as described above.  Lab values thus far unremarkable.  Given her tachycardia and shortness of breath with exertion, CT PE run ordered.  EKG documented below, unremarkable.  Sinus rhythm with a pulse of 70 in the room.  Without symptoms currently.    1419.  I spoke with Dr. Sinclair, cardiology with [facility].  I reviewed all results with him.  His concern is that she may be having mitral stenosis causing sinus tachycardia and dyspnea.  I will walk the patient here in the ED to determine if she does in fact spike up with her pulse while walking.  Either way, the patient needs close follow-up with cardiology.  He will have their clinic call her to schedule an urgent appointment.  I did place an order for Zio patch.  No medication changes are recommended at this time.    EKG  (1108)   Interpretation performed by me.  Rate: 90     Rhythm: sinus     Axis: nl  Intervals: NH (12-2) 170, QRS (<12) 82, QTc (>5) 426  P wave: down in V1     QRS complex: nl   ST segment / T-wave: nl  Conclusion: Possible left atrial enlargement.  Otherwise unremarkable.    Electronic medical chart reviewed, including medical problems, medications, medical allergies, social history.  Recent hospitalizations and surgical procedures reviewed.  Recent clinic visits and consultations reviewed.  Recent labs and test results reviewed.  Nursing notes reviewed.    1419.   The patient, their parent if applicable, and/or their medical decision maker(s) and I have reviewed all of the available historical information, applicable PMH, physical exam findings, and objective diagnostic data gathered during this ED visit.  We then discussed all work-up options and then together agreed upon the course taken during this visit.  The ultimate disposition and plan was a cooperative decision made between myself and the patient, their parent if applicable, and/or their legal decision maker(s).  The risks and benefits of all decisions made during this visit were discussed to the best of my abilities given the circumstances, and all parties are understanding of the pertinent ramifications of these decisions.      LABS  Labs Ordered and Resulted from Time of ED Arrival to Time of ED Departure   COMPREHENSIVE METABOLIC PANEL - Abnormal       Result Value    Sodium 140      Potassium 4.0      Chloride 102      Carbon Dioxide (CO2) 24      Anion Gap 14      Urea Nitrogen 9.8      Creatinine 0.74      Calcium 10.4 (*)     Glucose 112 (*)     Alkaline Phosphatase 78      AST 32      ALT 32      Protein Total 8.3      Albumin 4.9      Bilirubin Total 0.8      GFR Estimate 90     TSH WITH FREE T4 REFLEX - Normal    TSH 2.64     TROPONIN T, HIGH SENSITIVITY - Normal    Troponin T, High Sensitivity 10     CBC WITH PLATELETS AND DIFFERENTIAL    WBC Count 5.6      RBC Count 4.79      Hemoglobin 14.4      Hematocrit 42.9      MCV 90      MCH 30.1      MCHC 33.6      RDW 13.3      Platelet Count 233      % Neutrophils 58      % Lymphocytes 30      % Monocytes 10      % Eosinophils 1      % Basophils 1      % Immature Granulocytes 0      NRBCs per 100 WBC 0      Absolute Neutrophils 3.3      Absolute Lymphocytes 1.7      Absolute Monocytes 0.6      Absolute Eosinophils 0.1      Absolute Basophils 0.0      Absolute Immature Granulocytes 0.0      Absolute NRBCs 0.0         IMAGING  Images reviewed by me.  Radiology  report also reviewed.  CT Chest Pulmonary Embolism w Contrast   Final Result   IMPRESSION:   1.  No pulmonary artery embolism.   2.  No acute cardiopulmonary process.      EUSEBIO GREER MD            SYSTEM ID:  [id number]      Leadless EKG Monitor 3 to 7 Days    (Results Pending)       Procedures    Medications   0.9% sodium chloride BOLUS (0 mLs Intravenous Stopped 4/4/23 1256)   iopamidol (ISOVUE-370) solution 62 mL (62 mLs Intravenous $Given 4/4/23 1209)   sodium chloride 0.9 % bag 500mL for CT scan flush use (100 mLs Intravenous $Given 4/4/23 1209)         IMPRESSION       ICD-10-CM    1. Palpitations  R00.2 Leadless EKG Monitor 3 to 7 Days               Medication List      There are no discharge medications for this visit.                     Fabiano Hernandez MD  04/04/23 5042

## 2023-04-04 NOTE — ED TRIAGE NOTES
Patient reports feeling like her heart is racing for the past two days. Patient states she has a hx of mitral valve stenosis and takes metoprolol daily. Patient diagnosed with uti today.      Triage Assessment     Row Name 04/04/23 1054       Triage Assessment (Adult)    Airway WDL WDL       Respiratory WDL    Respiratory WDL X;rhythm/pattern    Rhythm/Pattern, Respiratory dyspnea on exertion       Skin Circulation/Temperature WDL    Skin Circulation/Temperature WDL WDL       Cardiac WDL    Cardiac WDL WDL       Peripheral/Neurovascular WDL    Peripheral Neurovascular WDL WDL       Cognitive/Neuro/Behavioral WDL    Cognitive/Neuro/Behavioral WDL WDL

## 2023-04-04 NOTE — DISCHARGE INSTRUCTIONS
RETURN TO THE EMERGENCY ROOM FOR THE FOLLOWING:    Severely worsened breathing, fainting, new chest pain, or at anytime for any concern.    FOLLOW UP:    Expect a phone call within the next 24 to 48 hours to help with scheduling an appointment with your [facility] cardiology team.  I spoke with Dr. Sinclair about this and he ensured me that you would be receiving a phone call.      I did place an order for an ambulatory monitor of your heart.  See phone number provided to help with scheduling.    TREATMENT RECOMMENDATIONS:    None new.  No changes.    NURSE ADVICE LINE:  [phone] or [phone]

## 2023-04-13 ENCOUNTER — HOSPITAL ENCOUNTER (OUTPATIENT)
Dept: CARDIOLOGY | Facility: CLINIC | Age: 63
Discharge: HOME | End: 2023-04-13
Attending: FAMILY MEDICINE | Admitting: FAMILY MEDICINE
Payer: COMMERCIAL

## 2023-04-13 DIAGNOSIS — R00.2 PALPITATIONS: ICD-10-CM

## 2023-04-13 PROCEDURE — 93242 EXT ECG>48HR<7D RECORDING: CPT

## 2023-04-13 PROCEDURE — 93244 EXT ECG>48HR<7D REV&INTERPJ: CPT | Performed by: INTERNAL MEDICINE

## 2023-04-27 DIAGNOSIS — K21.9 LARYNGOPHARYNGEAL REFLUX: ICD-10-CM

## 2023-04-27 DIAGNOSIS — Z87.19 HISTORY OF GASTROESOPHAGEAL REFLUX (GERD): ICD-10-CM

## 2023-04-27 DIAGNOSIS — Z87.09 HISTORY OF ALLERGIC RHINITIS: ICD-10-CM

## 2023-04-27 DIAGNOSIS — R09.89 CHRONIC THROAT CLEARING: ICD-10-CM

## 2023-05-12 ENCOUNTER — MYC MEDICAL ADVICE (OUTPATIENT)
Dept: OTOLARYNGOLOGY | Facility: CLINIC | Age: 63
End: 2023-05-12
Payer: COMMERCIAL

## 2023-05-12 DIAGNOSIS — Z87.09 HISTORY OF ALLERGIC RHINITIS: ICD-10-CM

## 2023-05-12 DIAGNOSIS — R09.89 CHRONIC THROAT CLEARING: ICD-10-CM

## 2023-05-12 DIAGNOSIS — Z87.19 HISTORY OF GASTROESOPHAGEAL REFLUX (GERD): ICD-10-CM

## 2023-05-12 DIAGNOSIS — K21.9 LARYNGOPHARYNGEAL REFLUX: ICD-10-CM

## 2023-05-12 RX ORDER — FAMOTIDINE 40 MG/1
40 TABLET, FILM COATED ORAL AT BEDTIME
Qty: 90 TABLET | Refills: 3 | Status: ON HOLD | OUTPATIENT
Start: 2023-05-12 | End: 2023-05-31

## 2023-05-12 RX ORDER — OMEPRAZOLE 40 MG/1
40 CAPSULE, DELAYED RELEASE ORAL DAILY
Qty: 90 CAPSULE | Refills: 0 | Status: ON HOLD | OUTPATIENT
Start: 2023-05-12 | End: 2023-05-30

## 2023-05-12 RX ORDER — OMEPRAZOLE 40 MG/1
40 CAPSULE, DELAYED RELEASE ORAL DAILY
Qty: 90 CAPSULE | Refills: 1 | Status: ON HOLD | OUTPATIENT
Start: 2023-05-12 | End: 2023-05-31

## 2023-05-12 NOTE — TELEPHONE ENCOUNTER
To provider to review and authorize.  When would you like to see this pt back in the office?    Hillary Lenz  [facility] RN

## 2023-05-13 NOTE — H&P (VIEW-ONLY)
Nola Horvath MD  Physician  Specialty:  Family Practice  Progress Notes     Sign when Signing Visit  Creation Time:  2023  8:28 AM     Expand AllCollapse All <redacted file path>       [facility]       Preoperative Examination   Mallika Stuart   : 1960   Gender: female     Date of Encounter: 2023     Nursing Notes:   Ragini Maya  2023  8:35 AM  Signed  Date of Surgery: 23  Surgeon: Dr Mark Rose  Surgery/Procedure name: HIATAL HERNIA REPAIR AND PARTIAL FUNDOPLICATION,   Surgical Specialty: [facility]Surgical Facility: [facility] Fax Number: [phone]  Surgery type: outpatient  Primary Physician: Kiara Kurtz        Risk Factors  Does the patient have:  Asthma no  CAD yes  Renal insufficiency no  Diabetes no  CHF no  Recent MI no  Valvular heart disease no     Medication Review  Is the patient currently taking:  Antihistamines no  Aspirin or ibuprofen products yes  Blood thinners no              If YES, Does patient have to hold medication? NA  Metformin no  Seizure medication no     Bleeding Risk  Does the patient have a history of:  Bleeding or easy bruising no  Bleeding into the joints or muscles no  Frequent nosebleeds no     Did the patient have difficulty with bleeding after:  Loss of teeth or dental extractions no  Previous surgery no  Previous injury no     Is there a family history of:  A blood or bleeding disorder no  Blood transfusion no     Malignant Hyperthermia no  Adverse reactions to anesthesia no     Anesthesia Risk  Does the patient have a history of:  Difficulty waking up from anesthesia no  Excessive postop nausea and vomiting yes  Sensitivity to narcotics no  Specific sensitivity to anesthesia no  Sleep apnea no     Ragini Maya  ....................  2023   8:03 AM                      History of Present Illness   Mallika Stuart is a very pleasant 63 y.o. year old female who presents  today for a preoperative examination. She has a history of hiatal hernia and she is scheduled to have a HIATAL HERNIA REPAIR AND PARTIAL FUNDOPLICATION on 5/26/23. She does have a history of wheezing. When this occurs, prednisone is beneficial. With the weather changes, she has noticed some wheezing again recently. She has a history of moderate mitral stenosis, mild mitral regurgitation with rheumatic heart diease and CAD. Her blood pressure has improved and is now in the normal range. Her last echocardiogram was done on 4/21/23, which was found to be largely unchanged. She follows with Cardiology regularly and her most recent visit was on 4/20/23. No concerning cardiac symptoms at this time. She also has chronic back pain. She [place], which has caused more muscle spasms in her back. She has been using the flexeril as needed and this has been beneficial.       Review of Systems   A comprehensive review of systems was negative except for items noted in HPI.          Patient Active Problem List   Diagnosis Code     Allergic rhinitis J30.9     CAD (coronary artery disease) I25.10     Dyslipidemia E78.5     GERD (gastroesophageal reflux disease) K21.9     Hypothyroidism E03.9     Mitral regurgitation I34.0     Mitral stenosis I05.0     NSVT (nonsustained ventricular tachycardia) I47.29     PAT (paroxysmal atrial tachycardia) (HC) I47.1     Family history of colon cancer Z80.0     Renal stone N20.0     Hydronephrosis of left kidney N13.30     UTI (urinary tract infection) N39.0     Kidney stone N20.0     E. coli UTI (urinary tract infection) N39.0, B96.20     Gram negative sepsis (HC) A41.50     Left flank mass R19.00     Flank pain R10.9     Retained ureteral stent Z96.0     Loose stools R19.5     Anterolisthesis of lumbar spine M43.16     DISH (diffuse idiopathic skeletal hyperostosis) M48.10     Insomnia G47.00     Coronary artery disease I25.10     Hyperlipidemia E78.5            Current Outpatient Medications   Medication Sig     albuterol HFA (Ventolin HFA) 90 mcg/actuation inhaler Inhale 2 Puffs by mouth every 4 hours if needed for Wheezing (coughing).     amLODIPine (Norvasc) 5 mg tablet Take 1 Tablet (5 mg) by mouth once daily.     aspirin (ECOTRIN) 81 mg enteric coated tablet Take 81 mg by mouth once daily with a meal.     benzonatate (Tessalon Perles) 100 mg capsule Take 1 Capsule (100 mg) by mouth 3 times daily if needed for Cough.     buPROPion (WELLBUTRIN XL) 150 mg Extended-Release tablet Take 1 Tablet (150 mg) by mouth once daily.     calcium citrate (CITRACAL) 200 mg (950 mg) tablet Take 1,900 mg by mouth once daily.     cholecalciferol, vitamin D3, (VITAMIN D3 ORAL) Take 1 Cap by mouth once daily.     cyanocobalamin (VITAMIN B12) 1,000 mcg tablet Take 1,000 mcg by mouth once daily.     cyclobenzaprine (FLEXERIL) 10 mg tablet Take 1 Tablet (10 mg) by mouth 3 times daily.     famotidine (PEPCID) 40 mg tablet Take 1 tablet (40 mg) by mouth At Bedtime     guaFENesin SR (Mucinex) 1,200 mg Ta12 Take 1,200 mg by mouth once daily if needed (to help with seasonal allergies).     levothyroxine (SYNTHROID) 88 mcg tablet Take 1 Tablet (88 mcg) by mouth once daily.     metoprolol succinate (TOPROL XL) 50 mg sustained-release tablet Take 1 Tablet (50 mg) by mouth once daily.     metoprolol succinate SR (TOPROL XL) 200 mg Sustained-Release tablet Take 1 Tablet (200 mg) by mouth once daily.     omeprazole (PRILOSEC) 40 mg Delayed-Release capsule Take 1 capsule (40 mg) by mouth daily Take 20-30 minutes prior to morning meal     rosuvastatin (CRESTOR) 20 mg tablet Take 1 Tablet (20 mg) by mouth at bedtime.     traZODone (DESYREL) 50 mg tablet Take 1-2 tablets at bedtime as needed for insomnia      No current facility-administered medications for this visit.      Medications have been reviewed by me and are current to the best of my knowledge and ability.           Allergies   Allergen  Reactions     Statins-Hmg-Coa Reductase Inhibitors Myalgia      Past Surgical History         Past Surgical History:   . Laterality Date     ADENOIDECTOMY         AMNIOCENTESIS DIAGNOSTIC         Amniocentesis     APPENDECTOMY   1970     Appendectomy     APPENDECTOMY         Appendectomy     BREAST BIOPSY   2008      SECTION          Section     COLONOSCOPY DIAGNOSTIC        Colonoscopy     COLONOSCOPY W/ BIOPSIES AND POLYPECTOMY   2020     tubular adenoma, repeat 5 years     CYSTOSCOPY W/ URETEROSCOPY W/ LITHOTRIPSY   6/3/2021           excision of graulation tissue        IMO DENTAL SURGERY PROCEDURE         Dental Procedure     IR Nephrostomy Internalization with Stent Left 2021     Dr Bryant     CO UNLISTED CRANIOFACIAL & MAXILLOFACIAL PROCEDURE         Maxillary-Facial Procedure     CO UNLISTED PROCEDURE INNER EAR         Ear Procedure, Inner     TOMAS AND BSO   2004     fibroids, endometriosis     TMJ ARTHOTOMY   1992     TOTAL ABDOMINAL HYSTERECTOMY        Hysterectomy, Total Abdominal     TYMPANOSTOMY         x4     VAGINAL DELIVERY         Vaginal Delivery         Social History            Tobacco Use     Smoking status: Never     Smokeless tobacco: Never   Vaping Use     Vaping status: Never Used   Substance Use Topics     Alcohol use: Yes       Alcohol/week: 0.8 - 1.7 standard drinks of alcohol       Types: 1 - 2 Standard drinks or equivalent per week       Comment: social      Drug use: No            Family History   Problem Relation Age of Onset     Arthritis Mother       Blood Disease Mother           form of lupus     Diabetes Mother       Hypertension Mother       Heart Disease Mother       Osteoporosis Mother       Thyroid Disease Mother       Cancer-breast Mother 84     Hypertension Father       Cancer-colon Maternal Grandmother       Alcohol/Drug Maternal Grandfather       Cancer Paternal Grandfather       Cancer-colon Maternal Aunt       Cancer-ovarian No  "Family History           PAST DIFFICULTY WITH ANESTHESIA: None  BLEEDING DISORDERS: No      Physical Exam   /78 (Cuff Site: Left Arm, Position: Sitting, Cuff Size: Adult Large)   Pulse 76   Ht 1.645 m (5' 4.75\")   Wt 78 kg (172 lb)   BMI 28.84 kg/m   Body mass index is 28.84 kg/m .  General Appearance: Normal., Pleasant, alert, appropriate appearance for age. No acute distress  Head Exam: Normal. Normocephalic, atraumatic.  Eye Exam: Normal external eye, conjunctiva, lids, cornea. JOYA.  Ear Exam: Normal TM's bilaterally. Normal auditory canals and external ears. Non-tender.  Nose Exam: Normal external nose, mucous membranes, and septum.  OroPharynx Exam: Normal.  Neck Exam: Normal., Supple, no masses or nodes.  Chest/Respiratory Exam: Normal chest wall and respirations. Clear to auscultation.  Cardiovascular Exam: Normal.  Gastrointestinal Exam: Normal.  Soft, nontender, no abnormal masses or organomegaly.  Musculoskeletal Exam: Back is straight and non-tender, full ROM of upper and lower extremities.  Skin: Normal. and no rash or abnormalities  Psychiatric Exam: Normal.  Alert and oriented, appropriate affect.      Assessment / Plan      Labs: yes         Results for orders placed or performed in visit on 05/11/23   BASIC METABOLIC PANEL   Result Value Ref Range Status     SODIUM 140 136 - 145 mmol/L Final     POTASSIUM 3.5 3.5 - 5.1 mmol/L Final     CHLORIDE 103 98 - 107 mmol/L Final     CO2,TOTAL 28 22 - 31 mmol/L Final     ANION GAP 9 5 - 18 Final     GLUCOSE 115 (H) 70 - 100 mg/dL Final     CALCIUM 9.3 8.4 - 10.2 mg/dL Final     BUN 11 8 - 25 mg/dL Final     CREATININE 0.77 0.57 - 1.11 mg/dL Final     BUN/CREAT RATIO           14 10 - 20                 Final     eGFR 86 (L) >90 mL/min/1.73m2 Final   HEMOGLOBIN   Result Value Ref Range Status     HEMOGLOBIN                12.3 12.0 - 16.0 g/dL Final     MCV                       90 81 - 99 fL Final      ECG: no      ICD-10-CM     1. Pre-op exam  " Z01.818 BASIC METABOLIC PANEL       HEMOGLOBIN       2. Back pain, unspecified back location, unspecified back pain laterality, unspecified chronicity  M54.9 cyclobenzaprine (FLEXERIL) 10 mg tablet       3. Muscle spasm  M62.838 cyclobenzaprine (FLEXERIL) 10 mg tablet       4. Wheezing  R06.2 predniSONE (DELTASONE) 20 mg tablet       5. Coronary artery disease, unspecified vessel or lesion type, unspecified whether angina present, unspecified whether native or transplanted heart  I25.10         Mallika Stuart is a very pleasant 63 y.o. year old female who presents today for a preoperative examination. She has a history of hiatal hernia and she is scheduled to have a HIATAL HERNIA REPAIR AND PARTIAL FUNDOPLICATION on 5/26/23. No contraindication to surgery at this time, recommend proceeding with surgery as scheduled. Patient is cleared for planned procedure. We will prescribe prednisone, as this typically works well for her wheezing. No evidence of an infectious process at this time. Discussed signs and symptoms to watch for and when to seek medical care. Recommend returning to clinic if symptoms not improving or if developing any new or concerning symptoms. We will continue with Flexeril as needed for her muscle spasms. Also recommend gentle stretches and exercises. Recommend returning to clinic if symptoms not improving or if he develops any new or concerning symptoms.      The patient understands and agrees with plan.      Electronically Signed by:   Nola Horvath MD ....................  5/11/2023   8:28 AM

## 2023-05-15 NOTE — TELEPHONE ENCOUNTER
Patient having procedure done 5/26/23 for hiatal hernia.   Per review of recommendations from Dr. Rose- plan will be to try to stop meds after procedure.   Dr. Sims did refill for 90 days to get patient through     Anali NEGRON RN  [facility]

## 2023-05-19 ENCOUNTER — TELEPHONE (OUTPATIENT)
Dept: SURGERY | Facility: CLINIC | Age: 63
End: 2023-05-19
Payer: COMMERCIAL

## 2023-05-19 NOTE — LETTER
We've received instruction to get you scheduled for surgery with Dr Rose. We have that arranged as follows:     Pre-op Physical:  Call your primary clinic to schedule.    Surgery Date: 5/30/2023     Location: [facility] [address] [place] [address]    Approximate Arrival Time: 9:00 am  (Unless instructed differently by the pre-op call nurse)     Post op Appointment: 6/14/2023 at 8:00 am with        [facility]      [address] [place] [address]    Prep Tasks and Info:     A pre-op physical with your primary care doctor is required before surgery. This must be 10-30 days before surgery.  Since it required by anesthesia, your surgery will be cancelled if it's not done. Call your clinic asap to get this scheduled.    Review your medications with your primary care or prescribing physician; they will advise you which meds to stop and when, and when you can resume taking.  Certain medications like blood thinners need to be stopped in advance of surgery to proceed safely.    Please shower the evening before and morning of surgery with Hibiclens soap.  This can be found at your local pharmacy.     Fasting instructions will be provided by the pre-op nurse who will call you 1-3 days before surgery.  Typically we advise normal food up to 8 hours before you arrive for surgery. Clear liquids only from then until 2 hours before you arrive surgery then nothing at all by mouth.       Smoking impacts your body's ability to heal properly.  If you are a smoker, we strongly urge you to stop smoking 4-6 weeks before surgery. Plastic surgery patients are required to be nicotine free for 8 weeks before surgery.     You will need an adult to drive you home and stay with you 24 hours after surgery. Public transportation or Medical Van Services are not permitted.    Visitor restrictions are subject to change, please verify how many people can accompany you with  the pre-op nurse when they call.    We always encourage you to notify your insurance any time you have medical tests or procedures scheduled including surgery. The number is usually right on the back of your insurance card. Please call [facility] Cost of Care at [phone] if you'd like a surgery quote.       Call our office if you have any questions! Thank you!

## 2023-05-19 NOTE — TELEPHONE ENCOUNTER
Spoke with pt and let her know that due to a schedule change next week we have to reschedule her surgery. I have moved her case from 5/26 to 5/30.    WW notified.  New letter sent via [employer].

## 2023-05-20 ENCOUNTER — HEALTH MAINTENANCE LETTER (OUTPATIENT)
Age: 63
End: 2023-05-20

## 2023-05-25 NOTE — PROGRESS NOTES
Pt reported new onset of cough after being outside over the past two day. Pt does attribute this to poor air quality, environmental allergies. Encouraged pt to Call PCP to update and to be seen. Pt verbalized understanding and will call back with any updates.

## 2023-05-30 ENCOUNTER — ANESTHESIA (OUTPATIENT)
Dept: SURGERY | Facility: CLINIC | Age: 63
End: 2023-05-30
Payer: COMMERCIAL

## 2023-05-30 ENCOUNTER — HOSPITAL ENCOUNTER (OUTPATIENT)
Facility: CLINIC | Age: 63
Discharge: HOME | End: 2023-05-31
Attending: SURGERY | Admitting: SURGERY
Payer: COMMERCIAL

## 2023-05-30 ENCOUNTER — ANESTHESIA EVENT (OUTPATIENT)
Dept: SURGERY | Facility: CLINIC | Age: 63
End: 2023-05-30
Payer: COMMERCIAL

## 2023-05-30 DIAGNOSIS — Z87.19 HISTORY OF REPAIR OF HIATAL HERNIA: Primary | ICD-10-CM

## 2023-05-30 DIAGNOSIS — Z98.890 HISTORY OF REPAIR OF HIATAL HERNIA: Primary | ICD-10-CM

## 2023-05-30 DIAGNOSIS — K21.9 GASTROESOPHAGEAL REFLUX DISEASE WITHOUT ESOPHAGITIS: ICD-10-CM

## 2023-05-30 PROCEDURE — 250N000009 HC RX 250: Performed by: NURSE ANESTHETIST, CERTIFIED REGISTERED

## 2023-05-30 PROCEDURE — 999N000141 HC STATISTIC PRE-PROCEDURE NURSING ASSESSMENT: Performed by: SURGERY

## 2023-05-30 PROCEDURE — 43282 LAP PARAESOPH HER RPR W/MESH: CPT | Performed by: SURGERY

## 2023-05-30 PROCEDURE — 250N000009 HC RX 250: Performed by: ANESTHESIOLOGY

## 2023-05-30 PROCEDURE — C9290 INJ, BUPIVACAINE LIPOSOME: HCPCS | Performed by: SURGERY

## 2023-05-30 PROCEDURE — 258N000003 HC RX IP 258 OP 636: Performed by: ANESTHESIOLOGY

## 2023-05-30 PROCEDURE — 88305 TISSUE EXAM BY PATHOLOGIST: CPT | Mod: 26 | Performed by: PATHOLOGY

## 2023-05-30 PROCEDURE — 88305 TISSUE EXAM BY PATHOLOGIST: CPT | Mod: TC | Performed by: SURGERY

## 2023-05-30 PROCEDURE — 272N000001 HC OR GENERAL SUPPLY STERILE: Performed by: SURGERY

## 2023-05-30 PROCEDURE — 710N000010 HC RECOVERY PHASE 1, LEVEL 2, PER MIN: Performed by: SURGERY

## 2023-05-30 PROCEDURE — 250N000013 HC RX MED GY IP 250 OP 250 PS 637: Performed by: SURGERY

## 2023-05-30 PROCEDURE — 250N000011 HC RX IP 250 OP 636: Performed by: ANESTHESIOLOGY

## 2023-05-30 PROCEDURE — 250N000011 HC RX IP 250 OP 636: Performed by: SURGERY

## 2023-05-30 PROCEDURE — 278N000051 HC OR IMPLANT GENERAL: Performed by: SURGERY

## 2023-05-30 PROCEDURE — 88365 INSITU HYBRIDIZATION (FISH): CPT | Mod: TC | Performed by: SURGERY

## 2023-05-30 PROCEDURE — 250N000011 HC RX IP 250 OP 636: Performed by: NURSE ANESTHETIST, CERTIFIED REGISTERED

## 2023-05-30 PROCEDURE — 250N000025 HC SEVOFLURANE, PER MIN: Performed by: SURGERY

## 2023-05-30 PROCEDURE — 88341 IMHCHEM/IMCYTCHM EA ADD ANTB: CPT | Mod: 26 | Performed by: PATHOLOGY

## 2023-05-30 PROCEDURE — 88364 INSITU HYBRIDIZATION (FISH): CPT | Mod: 26 | Performed by: PATHOLOGY

## 2023-05-30 PROCEDURE — 710N000012 HC RECOVERY PHASE 2, PER MINUTE: Performed by: SURGERY

## 2023-05-30 PROCEDURE — 258N000003 HC RX IP 258 OP 636: Performed by: NURSE ANESTHETIST, CERTIFIED REGISTERED

## 2023-05-30 PROCEDURE — 370N000017 HC ANESTHESIA TECHNICAL FEE, PER MIN: Performed by: SURGERY

## 2023-05-30 PROCEDURE — 88342 IMHCHEM/IMCYTCHM 1ST ANTB: CPT | Mod: 26 | Performed by: PATHOLOGY

## 2023-05-30 PROCEDURE — 360N000080 HC SURGERY LEVEL 7, PER MIN: Performed by: SURGERY

## 2023-05-30 PROCEDURE — 258N000001 HC RX 258: Performed by: SURGERY

## 2023-05-30 PROCEDURE — S2900 ROBOTIC SURGICAL SYSTEM: HCPCS | Performed by: SURGERY

## 2023-05-30 PROCEDURE — 88365 INSITU HYBRIDIZATION (FISH): CPT | Mod: 26 | Performed by: PATHOLOGY

## 2023-05-30 DEVICE — IMPLANTABLE DEVICE: Type: IMPLANTABLE DEVICE | Site: ABDOMEN | Status: FUNCTIONAL

## 2023-05-30 RX ORDER — FLUTICASONE PROPIONATE 50 MCG
1 SPRAY, SUSPENSION (ML) NASAL DAILY PRN
COMMUNITY
End: 2024-08-29

## 2023-05-30 RX ORDER — GLYCOPYRROLATE 0.2 MG/ML
INJECTION, SOLUTION INTRAMUSCULAR; INTRAVENOUS PRN
Status: DISCONTINUED | OUTPATIENT
Start: 2023-05-30 | End: 2023-05-30

## 2023-05-30 RX ORDER — HYDROCODONE BITARTRATE AND ACETAMINOPHEN 5; 325 MG/1; MG/1
1-2 TABLET ORAL EVERY 4 HOURS PRN
Qty: 15 TABLET | Refills: 0 | Status: SHIPPED | OUTPATIENT
Start: 2023-05-30 | End: 2023-06-12

## 2023-05-30 RX ORDER — NALOXONE HYDROCHLORIDE 0.4 MG/ML
0.2 INJECTION, SOLUTION INTRAMUSCULAR; INTRAVENOUS; SUBCUTANEOUS
Status: DISCONTINUED | OUTPATIENT
Start: 2023-05-30 | End: 2023-05-31 | Stop reason: HOSPADM

## 2023-05-30 RX ORDER — KETOROLAC TROMETHAMINE 30 MG/ML
30 INJECTION, SOLUTION INTRAMUSCULAR; INTRAVENOUS EVERY 6 HOURS PRN
Status: DISCONTINUED | OUTPATIENT
Start: 2023-05-30 | End: 2023-05-31 | Stop reason: HOSPADM

## 2023-05-30 RX ORDER — LIDOCAINE 40 MG/G
CREAM TOPICAL
Status: DISCONTINUED | OUTPATIENT
Start: 2023-05-30 | End: 2023-05-30 | Stop reason: HOSPADM

## 2023-05-30 RX ORDER — LIDOCAINE HYDROCHLORIDE 10 MG/ML
INJECTION, SOLUTION INFILTRATION; PERINEURAL PRN
Status: DISCONTINUED | OUTPATIENT
Start: 2023-05-30 | End: 2023-05-30

## 2023-05-30 RX ORDER — BENZONATATE 100 MG/1
100 CAPSULE ORAL 3 TIMES DAILY PRN
COMMUNITY
End: 2024-08-29

## 2023-05-30 RX ORDER — ONDANSETRON 2 MG/ML
INJECTION INTRAMUSCULAR; INTRAVENOUS PRN
Status: DISCONTINUED | OUTPATIENT
Start: 2023-05-30 | End: 2023-05-30

## 2023-05-30 RX ORDER — DOCUSATE SODIUM 100 MG/1
100 CAPSULE, LIQUID FILLED ORAL 2 TIMES DAILY
Qty: 30 CAPSULE | Refills: 0 | Status: SHIPPED | OUTPATIENT
Start: 2023-05-30

## 2023-05-30 RX ORDER — HYDROCODONE BITARTRATE AND ACETAMINOPHEN 5; 325 MG/1; MG/1
1 TABLET ORAL EVERY 6 HOURS PRN
Status: DISCONTINUED | OUTPATIENT
Start: 2023-05-30 | End: 2023-05-31 | Stop reason: HOSPADM

## 2023-05-30 RX ORDER — SIMETHICONE 80 MG
80 TABLET,CHEWABLE ORAL EVERY 6 HOURS PRN
Status: DISCONTINUED | OUTPATIENT
Start: 2023-05-30 | End: 2023-05-31 | Stop reason: HOSPADM

## 2023-05-30 RX ORDER — NALOXONE HYDROCHLORIDE 0.4 MG/ML
0.4 INJECTION, SOLUTION INTRAMUSCULAR; INTRAVENOUS; SUBCUTANEOUS
Status: DISCONTINUED | OUTPATIENT
Start: 2023-05-30 | End: 2023-05-31 | Stop reason: HOSPADM

## 2023-05-30 RX ORDER — CETIRIZINE HYDROCHLORIDE 10 MG/1
10 TABLET ORAL DAILY
COMMUNITY

## 2023-05-30 RX ORDER — SCOLOPAMINE TRANSDERMAL SYSTEM 1 MG/1
1 PATCH, EXTENDED RELEASE TRANSDERMAL
Status: DISCONTINUED | OUTPATIENT
Start: 2023-05-30 | End: 2023-05-31 | Stop reason: HOSPADM

## 2023-05-30 RX ORDER — METOPROLOL SUCCINATE 50 MG/1
50 TABLET, EXTENDED RELEASE ORAL DAILY PRN
COMMUNITY

## 2023-05-30 RX ORDER — ONDANSETRON 4 MG/1
4 TABLET, ORALLY DISINTEGRATING ORAL EVERY 6 HOURS PRN
Status: DISCONTINUED | OUTPATIENT
Start: 2023-05-30 | End: 2023-05-30

## 2023-05-30 RX ORDER — ONDANSETRON 2 MG/ML
4 INJECTION INTRAMUSCULAR; INTRAVENOUS EVERY 30 MIN PRN
Status: DISCONTINUED | OUTPATIENT
Start: 2023-05-30 | End: 2023-05-30 | Stop reason: HOSPADM

## 2023-05-30 RX ORDER — FENTANYL CITRATE 50 UG/ML
50 INJECTION, SOLUTION INTRAMUSCULAR; INTRAVENOUS EVERY 5 MIN PRN
Status: DISCONTINUED | OUTPATIENT
Start: 2023-05-30 | End: 2023-05-30 | Stop reason: HOSPADM

## 2023-05-30 RX ORDER — TRAMADOL HYDROCHLORIDE 50 MG/1
50 TABLET ORAL EVERY 6 HOURS PRN
Status: DISCONTINUED | OUTPATIENT
Start: 2023-05-30 | End: 2023-05-31 | Stop reason: HOSPADM

## 2023-05-30 RX ORDER — GABAPENTIN 100 MG/1
100 CAPSULE ORAL 3 TIMES DAILY
Status: DISCONTINUED | OUTPATIENT
Start: 2023-05-30 | End: 2023-05-31 | Stop reason: HOSPADM

## 2023-05-30 RX ORDER — PROPOFOL 10 MG/ML
INJECTION, EMULSION INTRAVENOUS PRN
Status: DISCONTINUED | OUTPATIENT
Start: 2023-05-30 | End: 2023-05-30

## 2023-05-30 RX ORDER — OXYCODONE HYDROCHLORIDE 5 MG/1
5 TABLET ORAL
Status: DISCONTINUED | OUTPATIENT
Start: 2023-05-30 | End: 2023-05-30 | Stop reason: HOSPADM

## 2023-05-30 RX ORDER — CETIRIZINE HYDROCHLORIDE 10 MG/1
10 TABLET ORAL DAILY
Status: DISCONTINUED | OUTPATIENT
Start: 2023-05-31 | End: 2023-05-31 | Stop reason: HOSPADM

## 2023-05-30 RX ORDER — AMLODIPINE BESYLATE 5 MG/1
5 TABLET ORAL DAILY
Status: DISCONTINUED | OUTPATIENT
Start: 2023-05-31 | End: 2023-05-31 | Stop reason: HOSPADM

## 2023-05-30 RX ORDER — PROPOFOL 10 MG/ML
INJECTION, EMULSION INTRAVENOUS CONTINUOUS PRN
Status: DISCONTINUED | OUTPATIENT
Start: 2023-05-30 | End: 2023-05-30

## 2023-05-30 RX ORDER — CYCLOBENZAPRINE HCL 10 MG
10 TABLET ORAL 3 TIMES DAILY PRN
COMMUNITY

## 2023-05-30 RX ORDER — ONDANSETRON 4 MG/1
4 TABLET, ORALLY DISINTEGRATING ORAL EVERY 30 MIN PRN
Status: DISCONTINUED | OUTPATIENT
Start: 2023-05-30 | End: 2023-05-30 | Stop reason: HOSPADM

## 2023-05-30 RX ORDER — LEVOTHYROXINE SODIUM 88 UG/1
88 TABLET ORAL DAILY
Status: DISCONTINUED | OUTPATIENT
Start: 2023-05-31 | End: 2023-05-31 | Stop reason: HOSPADM

## 2023-05-30 RX ORDER — AMLODIPINE BESYLATE 5 MG/1
5 TABLET ORAL DAILY
COMMUNITY

## 2023-05-30 RX ORDER — HYDROMORPHONE HCL IN WATER/PF 6 MG/30 ML
0.2 PATIENT CONTROLLED ANALGESIA SYRINGE INTRAVENOUS
Status: DISCONTINUED | OUTPATIENT
Start: 2023-05-30 | End: 2023-05-31 | Stop reason: HOSPADM

## 2023-05-30 RX ORDER — ROSUVASTATIN CALCIUM 10 MG/1
20 TABLET, COATED ORAL AT BEDTIME
Status: DISCONTINUED | OUTPATIENT
Start: 2023-05-30 | End: 2023-05-31 | Stop reason: HOSPADM

## 2023-05-30 RX ORDER — FLUTICASONE FUROATE AND VILANTEROL 100; 25 UG/1; UG/1
1 POWDER RESPIRATORY (INHALATION) DAILY
Status: DISCONTINUED | OUTPATIENT
Start: 2023-05-30 | End: 2023-05-31 | Stop reason: HOSPADM

## 2023-05-30 RX ORDER — BENZONATATE 100 MG/1
100 CAPSULE ORAL 3 TIMES DAILY PRN
Status: DISCONTINUED | OUTPATIENT
Start: 2023-05-30 | End: 2023-05-31 | Stop reason: HOSPADM

## 2023-05-30 RX ORDER — GABAPENTIN 100 MG/1
100 CAPSULE ORAL 3 TIMES DAILY
Qty: 30 CAPSULE | Refills: 0 | Status: SHIPPED | OUTPATIENT
Start: 2023-05-30 | End: 2024-08-29

## 2023-05-30 RX ORDER — ACETAMINOPHEN 325 MG/1
650 TABLET ORAL EVERY 4 HOURS PRN
Qty: 100 TABLET | Refills: 0 | Status: SHIPPED | OUTPATIENT
Start: 2023-05-30

## 2023-05-30 RX ORDER — ACETAMINOPHEN 325 MG/1
975 TABLET ORAL ONCE
Status: DISCONTINUED | OUTPATIENT
Start: 2023-05-30 | End: 2023-05-30 | Stop reason: HOSPADM

## 2023-05-30 RX ORDER — SODIUM CHLORIDE, SODIUM LACTATE, POTASSIUM CHLORIDE, CALCIUM CHLORIDE 600; 310; 30; 20 MG/100ML; MG/100ML; MG/100ML; MG/100ML
INJECTION, SOLUTION INTRAVENOUS CONTINUOUS
Status: DISCONTINUED | OUTPATIENT
Start: 2023-05-30 | End: 2023-05-30 | Stop reason: HOSPADM

## 2023-05-30 RX ORDER — OXYCODONE HYDROCHLORIDE 5 MG/1
10 TABLET ORAL
Status: DISCONTINUED | OUTPATIENT
Start: 2023-05-30 | End: 2023-05-30 | Stop reason: HOSPADM

## 2023-05-30 RX ORDER — LIDOCAINE 40 MG/G
CREAM TOPICAL
Status: DISCONTINUED | OUTPATIENT
Start: 2023-05-30 | End: 2023-05-31 | Stop reason: HOSPADM

## 2023-05-30 RX ORDER — BUPROPION HYDROCHLORIDE 150 MG/1
150 TABLET ORAL DAILY
Status: DISCONTINUED | OUTPATIENT
Start: 2023-05-31 | End: 2023-05-31 | Stop reason: HOSPADM

## 2023-05-30 RX ORDER — KETOROLAC TROMETHAMINE 30 MG/ML
INJECTION, SOLUTION INTRAMUSCULAR; INTRAVENOUS PRN
Status: DISCONTINUED | OUTPATIENT
Start: 2023-05-30 | End: 2023-05-30

## 2023-05-30 RX ORDER — TRAZODONE HYDROCHLORIDE 50 MG/1
50 TABLET, FILM COATED ORAL
Status: DISCONTINUED | OUTPATIENT
Start: 2023-05-30 | End: 2023-05-31 | Stop reason: HOSPADM

## 2023-05-30 RX ORDER — GUAIFENESIN 600 MG/1
1200 TABLET, EXTENDED RELEASE ORAL 2 TIMES DAILY PRN
COMMUNITY
End: 2024-08-29

## 2023-05-30 RX ORDER — HYDROMORPHONE HCL IN WATER/PF 6 MG/30 ML
0.4 PATIENT CONTROLLED ANALGESIA SYRINGE INTRAVENOUS EVERY 5 MIN PRN
Status: DISCONTINUED | OUTPATIENT
Start: 2023-05-30 | End: 2023-05-30 | Stop reason: HOSPADM

## 2023-05-30 RX ORDER — FLUTICASONE PROPIONATE 50 MCG
1 SPRAY, SUSPENSION (ML) NASAL DAILY PRN
Status: DISCONTINUED | OUTPATIENT
Start: 2023-05-30 | End: 2023-05-31 | Stop reason: HOSPADM

## 2023-05-30 RX ORDER — DEXAMETHASONE SODIUM PHOSPHATE 10 MG/ML
INJECTION, SOLUTION INTRAMUSCULAR; INTRAVENOUS PRN
Status: DISCONTINUED | OUTPATIENT
Start: 2023-05-30 | End: 2023-05-30

## 2023-05-30 RX ORDER — ONDANSETRON 4 MG/1
4 TABLET, ORALLY DISINTEGRATING ORAL ONCE
Status: COMPLETED | OUTPATIENT
Start: 2023-05-30 | End: 2023-05-30

## 2023-05-30 RX ORDER — HYDROMORPHONE HCL IN WATER/PF 6 MG/30 ML
0.2 PATIENT CONTROLLED ANALGESIA SYRINGE INTRAVENOUS EVERY 5 MIN PRN
Status: DISCONTINUED | OUTPATIENT
Start: 2023-05-30 | End: 2023-05-30 | Stop reason: HOSPADM

## 2023-05-30 RX ORDER — FENTANYL CITRATE 50 UG/ML
25 INJECTION, SOLUTION INTRAMUSCULAR; INTRAVENOUS
Status: DISCONTINUED | OUTPATIENT
Start: 2023-05-30 | End: 2023-05-30 | Stop reason: HOSPADM

## 2023-05-30 RX ORDER — ASPIRIN 81 MG/1
81 TABLET ORAL DAILY
Status: DISCONTINUED | OUTPATIENT
Start: 2023-05-30 | End: 2023-05-31 | Stop reason: HOSPADM

## 2023-05-30 RX ORDER — FENTANYL CITRATE 50 UG/ML
INJECTION, SOLUTION INTRAMUSCULAR; INTRAVENOUS PRN
Status: DISCONTINUED | OUTPATIENT
Start: 2023-05-30 | End: 2023-05-30

## 2023-05-30 RX ORDER — FENTANYL CITRATE 50 UG/ML
25 INJECTION, SOLUTION INTRAMUSCULAR; INTRAVENOUS EVERY 5 MIN PRN
Status: DISCONTINUED | OUTPATIENT
Start: 2023-05-30 | End: 2023-05-30 | Stop reason: HOSPADM

## 2023-05-30 RX ORDER — ALBUTEROL SULFATE 90 UG/1
2 AEROSOL, METERED RESPIRATORY (INHALATION) EVERY 4 HOURS PRN
Status: DISCONTINUED | OUTPATIENT
Start: 2023-05-30 | End: 2023-05-31 | Stop reason: HOSPADM

## 2023-05-30 RX ORDER — CYCLOBENZAPRINE HCL 10 MG
10 TABLET ORAL 3 TIMES DAILY PRN
Status: DISCONTINUED | OUTPATIENT
Start: 2023-05-30 | End: 2023-05-31 | Stop reason: HOSPADM

## 2023-05-30 RX ORDER — HYDROMORPHONE HCL IN WATER/PF 6 MG/30 ML
0.4 PATIENT CONTROLLED ANALGESIA SYRINGE INTRAVENOUS
Status: DISCONTINUED | OUTPATIENT
Start: 2023-05-30 | End: 2023-05-31 | Stop reason: HOSPADM

## 2023-05-30 RX ADMIN — LIDOCAINE HYDROCHLORIDE 2 ML: 10 INJECTION, SOLUTION INFILTRATION; PERINEURAL at 10:59

## 2023-05-30 RX ADMIN — KETOROLAC TROMETHAMINE 15 MG: 30 INJECTION, SOLUTION INTRAMUSCULAR at 12:48

## 2023-05-30 RX ADMIN — SUGAMMADEX 200 MG: 100 INJECTION, SOLUTION INTRAVENOUS at 12:46

## 2023-05-30 RX ADMIN — ROSUVASTATIN CALCIUM 20 MG: 10 TABLET, FILM COATED ORAL at 21:06

## 2023-05-30 RX ADMIN — KETOROLAC TROMETHAMINE 30 MG: 30 INJECTION, SOLUTION INTRAMUSCULAR; INTRAVENOUS at 18:38

## 2023-05-30 RX ADMIN — SODIUM CHLORIDE, POTASSIUM CHLORIDE, SODIUM LACTATE AND CALCIUM CHLORIDE: 600; 310; 30; 20 INJECTION, SOLUTION INTRAVENOUS at 09:29

## 2023-05-30 RX ADMIN — FENTANYL CITRATE 100 MCG: 50 INJECTION, SOLUTION INTRAMUSCULAR; INTRAVENOUS at 10:59

## 2023-05-30 RX ADMIN — HYDROCODONE BITARTRATE AND ACETAMINOPHEN 1 TABLET: 5; 325 TABLET ORAL at 18:37

## 2023-05-30 RX ADMIN — MIDAZOLAM 2 MG: 1 INJECTION INTRAMUSCULAR; INTRAVENOUS at 10:54

## 2023-05-30 RX ADMIN — PROPOFOL 50 MCG/KG/MIN: 10 INJECTION, EMULSION INTRAVENOUS at 11:05

## 2023-05-30 RX ADMIN — GLYCOPYRROLATE 0.2 MG: 0.2 INJECTION INTRAMUSCULAR; INTRAVENOUS at 11:50

## 2023-05-30 RX ADMIN — SCOPALAMINE 1 PATCH: 1 PATCH, EXTENDED RELEASE TRANSDERMAL at 09:52

## 2023-05-30 RX ADMIN — DEXAMETHASONE SODIUM PHOSPHATE 10 MG: 10 INJECTION, SOLUTION INTRAMUSCULAR; INTRAVENOUS at 10:59

## 2023-05-30 RX ADMIN — ROCURONIUM BROMIDE 50 MG: 10 INJECTION, SOLUTION INTRAVENOUS at 11:06

## 2023-05-30 RX ADMIN — PROPOFOL 130 MG: 10 INJECTION, EMULSION INTRAVENOUS at 10:59

## 2023-05-30 RX ADMIN — ONDANSETRON 4 MG: 4 TABLET, ORALLY DISINTEGRATING ORAL at 14:20

## 2023-05-30 RX ADMIN — Medication 60 MG: at 10:59

## 2023-05-30 RX ADMIN — SODIUM CHLORIDE, POTASSIUM CHLORIDE, SODIUM LACTATE AND CALCIUM CHLORIDE 500 ML: 600; 310; 30; 20 INJECTION, SOLUTION INTRAVENOUS at 18:38

## 2023-05-30 RX ADMIN — PROCHLORPERAZINE EDISYLATE 5 MG: 5 INJECTION INTRAMUSCULAR; INTRAVENOUS at 14:43

## 2023-05-30 RX ADMIN — PHENYLEPHRINE HYDROCHLORIDE 200 MCG: 10 INJECTION INTRAVENOUS at 11:50

## 2023-05-30 RX ADMIN — ROCURONIUM BROMIDE 10 MG: 10 INJECTION, SOLUTION INTRAVENOUS at 12:01

## 2023-05-30 RX ADMIN — GABAPENTIN 100 MG: 100 CAPSULE ORAL at 21:06

## 2023-05-30 RX ADMIN — HYDROMORPHONE HYDROCHLORIDE 0.5 MG: 1 INJECTION, SOLUTION INTRAMUSCULAR; INTRAVENOUS; SUBCUTANEOUS at 11:24

## 2023-05-30 RX ADMIN — ONDANSETRON 4 MG: 2 INJECTION INTRAMUSCULAR; INTRAVENOUS at 12:15

## 2023-05-30 RX ADMIN — SODIUM CHLORIDE, POTASSIUM CHLORIDE, SODIUM LACTATE AND CALCIUM CHLORIDE: 600; 310; 30; 20 INJECTION, SOLUTION INTRAVENOUS at 11:53

## 2023-05-30 RX ADMIN — SIMETHICONE 80 MG: 80 TABLET, CHEWABLE ORAL at 18:37

## 2023-05-30 RX ADMIN — HYDROMORPHONE HYDROCHLORIDE 0.5 MG: 1 INJECTION, SOLUTION INTRAMUSCULAR; INTRAVENOUS; SUBCUTANEOUS at 12:15

## 2023-05-30 ASSESSMENT — ACTIVITIES OF DAILY LIVING (ADL)
ADLS_ACUITY_SCORE: 36
ADLS_ACUITY_SCORE: 36
ADLS_ACUITY_SCORE: 35

## 2023-05-30 NOTE — INTERVAL H&P NOTE
I have reviewed the surgical (or preoperative) H&P that is linked to this encounter, and examined the patient. There are no significant changes    Plan for Procedure(s):  HIATAL HERNIA REPAIR AND PARTIAL FUNDOPLICATION,  ROBOT-ASSISTED, LAPAROSCOPIC, USING DA CHELY XI     Demetri Rose [facility]  [phone]

## 2023-05-30 NOTE — DISCHARGE INSTRUCTIONS
You received Toradol IV at 1250 pm. You may start Ibuprofen 600 mg every 6 hours for pain as needed after  7 pm tonight. Take with food.       Discharge Instructions: After Your Surgery  You ve just had surgery. During surgery, you were given medicine called anesthesia to keep you relaxed and free of pain. After surgery, you may have some pain or nausea. This is common. Here are some tips for feeling better and getting well after surgery.     Stay on schedule with your medicine.   Going home  Your healthcare provider will show you how to take care of yourself when you go home. He or she will also answer your questions. Have an adult family member or friend drive you home. For the first 24 hours after your surgery:  Don't drive or use heavy equipment.  Don't make important decisions or sign legal papers.  Don't drink alcohol.  Have someone stay with you. He or she can watch for problems and help keep you safe.  Be sure to go to all follow-up visits with your healthcare provider. And rest after your surgery for as long as your healthcare provider tells you to.  Coping with pain  If you have pain after surgery, pain medicine will help you feel better. Take it as told, before pain becomes severe. Also, ask your healthcare provider or pharmacist about other ways to control pain. This might be with heat, ice, or relaxation. And follow any other instructions your surgeon or nurse gives you.  Tips for taking pain medicine  To get the best relief possible, remember these points:  Pain medicines can upset your stomach. Taking them with a little food may help.  Most pain relievers taken by mouth need at least 20 to 30 minutes to start to work.  Don't wait till your pain becomes severe before you take your medicine. Try to time your medicine so that you can take it before starting an activity. This might be before you get dressed, go for a walk, or sit down for dinner.  Constipation is a common side effect of pain medicines.  You may take laxatives or stool softeners to help ease constipation. Drinking lots of fluids and eating foods such as fruits and vegetables that are high in fiber can also help.   Drinking alcohol and taking pain medicine can cause dizziness and slow your breathing. It can even be deadly. Don't drink alcohol while taking pain medicine.  Pain medicine can make you react more slowly to things. Don't drive or run machinery while taking pain medicine.  Your healthcare provider may tell you to take acetaminophen to help ease your pain. Ask him or her how much you are supposed to take each day. Acetaminophen or other pain relievers may interact with your prescription medicines or other over-the-counter (OTC) medicines. Some prescription medicines have acetaminophen and other ingredients. Using both prescription and OTC acetaminophen for pain can cause you to overdose. Read the labels on your OTC medicines with care. This will help you to clearly know the list of ingredients, how much to take, and any warnings. It may also help you not take too much acetaminophen. If you have questions or don't understand the information, ask your pharmacist or healthcare provider to explain it to you before you take the OTC medicine.  Managing nausea  Some people have an upset stomach after surgery. This is often because of anesthesia, pain, or pain medicine, or the stress of surgery. These tips will help you handle nausea and eat healthy foods as you get better. If you were on a special food plan before surgery, ask your healthcare provider if you should follow it while you get better. These tips may help:  Don't push yourself to eat. Your body will tell you when to eat and how much.  Start off with clear liquids and soup. They are easier to digest.  Next try semi-solid foods, such as mashed potatoes, applesauce, and gelatin, as you feel ready.  Slowly move to solid foods. Don t eat fatty, rich, or spicy foods at first.  Don't force  yourself to have 3 large meals a day. Instead eat smaller amounts more often.  Take pain medicines with a small amount of solid food, such as crackers or toast, to prevent nausea.  When to call your healthcare provider  Call your healthcare provider if:  You still have intolerable pain an hour after taking medicine. The medicine may not be strong enough.  You feel too sleepy, dizzy, or groggy. The medicine may be too strong.  You have side effects such as nausea or vomiting, or skin changes such as rash, itching, or hives. Your healthcare provider may suggest other medicines to control side effects.  Rash, itching, or hives may mean you have an allergic reaction. Report this right away. If you have trouble breathing or facial swelling, call 911 right away.  If you have obstructive sleep apnea  You were given anesthesia medicine during surgery to keep you comfortable and free of pain. After surgery, you may have more apnea spells because of this medicine and other medicines you were given. The spells may last longer than usual.   At home:  Keep using the continuous positive airway pressure (CPAP) device when you sleep. Unless your healthcare provider tells you not to, use it when you sleep, day or night. CPAP is a common device used to treat obstructive sleep apnea.  Talk with your provider before taking any pain medicine, muscle relaxants, or sedatives. Your provider will tell you about the possible dangers of taking these medicines.  [employer] last reviewed this educational content on 3/1/2019    0330-3580 [employer]. All rights reserved. This information is not intended as a substitute for professional medical care. Always follow your healthcare professional's instructions.

## 2023-05-30 NOTE — ANESTHESIA PROCEDURE NOTES
Airway         Procedure Start/Stop Times: 5/30/2023 11:02 AM  Staff -        CRNA: Sonali Rod APRN CRNA       Performed By: CRNAIndications and Patient Condition       Induction type:RSI       Mask difficulty assessment: 0 - not attempted    Final Airway Details       Final airway type: endotracheal airway       Successful airway: ETT - single  Endotracheal Airway Details        ETT size (mm): 7.0       Cuffed: yes       Successful intubation technique: direct laryngoscopy       DL Blade Type: MAC 3       Grade View of Cords: 1       Adjucts: stylet       Position: Right       Measured from: lips       Secured at (cm): 21    Post intubation assessment        Placement verified by: capnometry and equal breath sounds        Number of attempts at approach: 1       Secured with: silk tape       Ease of procedure: easy       Dentition: Intact and Unchanged    Medication(s) Administered   Medication Administration Time: 5/30/2023 11:02 AM

## 2023-05-30 NOTE — PHARMACY-ADMISSION MEDICATION HISTORY
Pharmacist Admission Medication History    Admission medication history is complete. The information provided in this note is only as accurate as the sources available at the time of the update.    Medication reconciliation/reorder completed by provider prior to medication history? No    Information Source(s): Patient and CareEverywhere/SureScripts via in-person    Pertinent Information: -    Changes made to PTA medication list:    Added: amlodipine, cyclobenzaprine, benzonatate, metoprolol 50mg prn, Mucinex, Zyretc, Flonase    Deleted: None    Changed: None    Allergies reviewed with patient and updates made in EHR: no    Medication History Completed By: Hafsa Fajardo PharmD 5/30/2023 9:53 AM    Prior to Admission medications    Medication Sig Last Dose Taking? Auth Provider Long Term End Date   albuterol (PROAIR HFA/PROVENTIL HFA/VENTOLIN HFA) 108 (90 Base) MCG/ACT inhaler Inhale 2 puffs into the lungs every 4 hours as needed Past Week at has with Yes Reported, Patient Yes    amLODIPine (NORVASC) 5 MG tablet Take 5 mg by mouth daily 5/30/2023 at am Yes Unknown, Entered By History Yes    aspirin (ASA) 81 MG EC tablet Take 81 mg by mouth daily 5/29/2023 Yes Reported, Patient     benzonatate (TESSALON) 100 MG capsule Take 100 mg by mouth 3 times daily as needed for cough 5/30/2023 Yes Unknown, Entered By History     buPROPion (WELLBUTRIN XL) 150 MG 24 hr tablet Take 1 Tablet (150 mg) by mouth once daily. 5/30/2023 Yes Reported, Patient Yes    calcium citrate (CITRACAL) 950 (200 Ca) MG tablet Take 1,900 mg by mouth daily 5/16/2023 Yes Reported, Patient     cetirizine (ZYRTEC) 10 MG tablet Take 10 mg by mouth daily 5/30/2023 at am Yes Unknown, Entered By History     cyclobenzaprine (FLEXERIL) 10 MG tablet Take 10 mg by mouth 3 times daily as needed for muscle spasms 5/29/2023 Yes Unknown, Entered By History     famotidine (PEPCID) 40 MG tablet Take 1 tablet (40 mg) by mouth At Bedtime 5/29/2023 Yes Mark Rose  Chi, DO     fluticasone (FLONASE) 50 MCG/ACT nasal spray Spray 1 spray into both nostrils daily as needed for rhinitis or allergies 5/29/2023 at not with Yes Unknown, Entered By History     fluticasone-salmeterol (ADVAIR) 250-50 MCG/ACT inhaler Inhale 1 puff into the lungs 2 times daily Past Month at not with Yes Reported, Patient Yes    guaiFENesin (MUCINEX) 600 MG 12 hr tablet Take 1,200 mg by mouth 2 times daily as needed for congestion 5/30/2023 Yes Unknown, Entered By History     levothyroxine (SYNTHROID/LEVOTHROID) 88 MCG tablet Take 1 tablet by mouth daily 5/30/2023 Yes Reported, Patient Yes    metoprolol succinate ER (TOPROL XL) 200 MG 24 hr tablet Take 200 mg by mouth daily 5/30/2023 Yes Reported, Patient Yes    metoprolol succinate ER (TOPROL XL) 50 MG 24 hr tablet Take 50 mg by mouth daily as needed (heart racing) Past Month Yes Unknown, Entered By History Yes    omeprazole (PRILOSEC) 40 MG DR capsule Take 1 capsule (40 mg) by mouth daily Take 20-30 minutes prior to morning meal 5/30/2023 Yes Mark Rose, DO     rosuvastatin (CRESTOR) 20 MG tablet Take 20 mg by mouth At Bedtime 5/29/2023 Yes Reported, Patient Yes    traZODone (DESYREL) 50 MG tablet Take 1-2 tablets at bedtime as needed for insomnia Past Week Yes Reported, Patient Yes    vitamin B-12 (CYANOCOBALAMIN) 1000 MCG tablet Take 1,000 mcg by mouth daily 5/16/2023 Yes Reported, Patient     Vitamin D3 (CHOLECALCIFEROL) 25 mcg (1000 units) tablet Take 1 capsule by mouth daily 5/16/2023 Yes Reported, Patient

## 2023-05-30 NOTE — ANESTHESIA PREPROCEDURE EVALUATION
Anesthesia Pre-Procedure Evaluation    Patient: Mallika Stuart   MRN: [id number] : 1960        Procedure : Procedure(s):  HIATAL HERNIA REPAIR AND PARTIAL FUNDOPLICATION,  ROBOT-ASSISTED, LAPAROSCOPIC, USING DA CHELY XI          Past Medical History:   Diagnosis Date     Arrhythmia      Difficulty walking      Gastroesophageal reflux disease      Gastroesophageal reflux disease without esophagitis 2023    Added automatically from request for surgery [id number]     Heart murmur      Irregular heart beat      PONV (postoperative nausea and vomiting)      Thyroid disease       Past Surgical History:   Procedure Laterality Date     ESOPHAGOSCOPY, GASTROSCOPY, DUODENOSCOPY (EGD), COMBINED N/A 2023    Procedure: ESOPHAGOGASTRODUODENOSCOPY, WITH BIOPSY;  Surgeon: Mark Rose DO;  Location: [facility] OR      Allergies   Allergen Reactions     Statins Muscle Pain (Myalgia)      Social History     Tobacco Use     Smoking status: Never     Smokeless tobacco: Never   Vaping Use     Vaping status: Not on file   Substance Use Topics     Alcohol use: Yes     Comment: occas.      Wt Readings from Last 1 Encounters:   23 76.2 kg (168 lb)        Anesthesia Evaluation   Pt has had prior anesthetic.     History of anesthetic complications  - PONV.      ROS/MED HX  ENT/Pulmonary:  - neg pulmonary ROS     Neurologic:  - neg neurologic ROS     Cardiovascular:     (+) -----Irregular Heartbeat/Palpitations, valvular problems/murmurs Rheumatic mitral stenosis, mild-mod.     METS/Exercise Tolerance:     Hematologic:  - neg hematologic  ROS     Musculoskeletal:       GI/Hepatic:     (+) GERD,     Renal/Genitourinary:  - neg Renal ROS     Endo:  - neg endo ROS     Psychiatric/Substance Use:       Infectious Disease:       Malignancy:       Other:            Physical Exam    Airway  airway exam normal      Mallampati: II   TM distance: > 3 FB   Neck ROM: full   Mouth opening: > 3 cm    Respiratory Devices and  Support         Dental       (+) Minor Abnormalities - some fillings, tiny chips      Cardiovascular   cardiovascular exam normal          Pulmonary   pulmonary exam normal                OUTSIDE LABS:  CBC:   Lab Results   Component Value Date    WBC 5.6 04/04/2023    HGB 14.4 04/04/2023    HCT 42.9 04/04/2023     04/04/2023     BMP:   Lab Results   Component Value Date     04/04/2023    POTASSIUM 4.0 04/04/2023    CHLORIDE 102 04/04/2023    CO2 24 04/04/2023    BUN 9.8 04/04/2023    CR 0.74 04/04/2023     (H) 04/04/2023     COAGS: No results found for: PTT, INR, FIBR  POC: No results found for: BGM, HCG, HCGS  HEPATIC:   Lab Results   Component Value Date    ALBUMIN 4.9 04/04/2023    PROTTOTAL 8.3 04/04/2023    ALT 32 04/04/2023    AST 32 04/04/2023    ALKPHOS 78 04/04/2023    BILITOTAL 0.8 04/04/2023     OTHER:   Lab Results   Component Value Date    EDMOND 10.4 (H) 04/04/2023    TSH 2.64 04/04/2023       Anesthesia Plan    ASA Status:  2   NPO Status:  NPO Appropriate    Anesthesia Type: General.     - Airway: ETT   Induction: Intravenous, RSI.           Consents    Anesthesia Plan(s) and associated risks, benefits, and realistic alternatives discussed. Questions answered and patient/representative(s) expressed understanding.    - Discussed:     - Discussed with:  Patient, Spouse      - Extended Intubation/Ventilatory Support Discussed: No.      - Patient is DNR/DNI Status: No         Postoperative Care    Pain management: Multi-modal analgesia.   PONV prophylaxis: Ondansetron (or other 5HT-3), Dexamethasone or Solumedrol     Comments:    Other Comments: GETA - RSI  1 PIV  Decadron, zofran, scop patch, propofol infusion  Dilaudid prn, fentanyl prn, ketamine + Toradol to minimize opioids            Anitha Montejo MD

## 2023-05-30 NOTE — OP NOTE
Name:  Mallika Stuart  PCP:  Nola Horvath  Procedure Date:  5/30/2023      Procedure(s):  HIATAL HERNIA REPAIR AND PARTIAL FUNDOPLICATION,  ROBOT-ASSISTED, LAPAROSCOPIC, USING DA CHELY XI    Pre-Procedure Diagnosis:  Hiatal hernia [K44.9]     Post-Procedure Diagnosis:    Hiatal hernia    Surgeon(s):  Mark Rose DO    Circulator: Dania Dowling RN; Ashlie Nolasco RN  Relief Scrub: Fabiano Ro  Scrub Person: Ashlie Wilks    Anesthesia Type:  GET      Findings:  Hiatal hernia  Small dark mediastinal lymph node    Operative Report:    The Patient was taken back to the operating room and placed in a supine position.  Endotracheal intubation was performed.  The abdomen was prepped and draped in a sterile fashion.  I  made an 8 mm incision.  I then establish pneumoperitoneum using a Veress needle technique.  Once this was complete I placed an 8 mm trocar with a 5 mm 30 degree camera into the abdomen.  I scrutinized the surrounding structures for any injuries upon entry none were found.  Next, I placed a Titus liver retractor via a 5 mm subxiphoid incision.  This was secured to the bed in the standard fashion.  I then placed 3 additional 8 mm trochars in the abdomen.  One in the right upper quadrant and 2 in the left upper quadrant. Enform absorbable mesh was placed into the abdomen.   The robot was docked in the standard fashion once the patient was placed in reverse Trendelenburg.    I then proceeded to address the hiatus.  I took down the pars lucidum and spared the vessels and gastrohepatic ligament.  I then entered the avascular plane between the hernia sac and the crura and circumferentially dissected out the hernia sac using vessel sealing device.  During this mediastinal dissection I noted a small dark lymph node which was removed and sent off the field as specimen.  Once I came to the left dhiraj I then turned my attention to the greater curvature of the stomach.  I took down  the short gastrics with the vessel sealing device all the way up to the angle of His.  I then created a retroesophageal window and placed 1/4 inch Penrose through this for esophageal retraction.  The posterior sac was also dissected free as was the small lipoma in the retroesophageal space.  I ensured a wide and deep mediastinal dissection of the periesophageal tissue taking care as to preserve the vagus nerves.  Once this was complete I then reapproximated the left and right crura with a 0 nonabsorbable V lock suture in a running fashion.  I then placed the previously fashioned  Enform mesh into the retroesophageal window and sutured this in place for extra added support with additional 2-0 Vicryl sutures.  The mesh was wrapped around the esophagus without stricturing or narrowing in a key hole fashion.  Once this was in place I then created a posterior toupee fundoplication.  This was done with adequate abdominal esophageal length and no evidence of gastric or esophageal retraction back into the chest cavity.  The fundoplication was completed with 0 Ethibond sutures in interrupted fashion.  Once this was complete all nonabsorbable material was removed.  The robot was undocked and Exparel local anesthetic was injected into all port sites.  The liver tractor was removed. I then removed all trochars and reapproximated skin edges with 4-0 Monocryl suture.  The abdomen was cleaned and all wounds were cleaned and covered with Steri-Strips and Band-Aids.  The Vela catheter was removed the patient was extubated sent to the PACU to undergo recovery.  All lap counts and needle counts were correct at the end of the procedure.    Estimated Blood Loss:   5 cc    Specimens:    ID Type Source Tests Collected by Time Destination   1 : MEDIASTINAL LYMPH NODE Tissue Lymph Node(s), Mediastinal, [facility] SURGICAL PATHOLOGY EXAM Mark Rose DO 5/30/2023 11:52 AM           Drains:        Complications:    None    Mark  Rose, DO

## 2023-05-30 NOTE — ANESTHESIA POSTPROCEDURE EVALUATION
Patient: Mallika Stuart    Procedure: Procedure(s):  HIATAL HERNIA REPAIR AND PARTIAL FUNDOPLICATION,  ROBOT-ASSISTED, LAPAROSCOPIC, USING DA CHELY XI       Anesthesia Type:  General    Note:  Disposition: Outpatient   Postop Pain Control: Uneventful            Sign Out: Well controlled pain   PONV: Yes            Symptoms: Nausea only            Sign Out: PONV/POV resolved with treatment   Neuro/Psych: Uneventful            Sign Out: Acceptable/Baseline neuro status   Airway/Respiratory: Uneventful            Sign Out: Acceptable/Baseline resp. status   CV/Hemodynamics: Uneventful            Sign Out: Acceptable CV status; No obvious hypovolemia; No obvious fluid overload   Other NRE: NONE   DID A NON-ROUTINE EVENT OCCUR? No    Event details/Postop Comments:  Pt with some light headedness when standing, will give 500cc fluid bolus.  Pt needs to travel 1 hour home,  does not drive in dark.  Will plan on working towards admission.            Last vitals:  Vitals Value Taken Time   /63 05/30/23 1350   Temp 36.2  C (97.1  F) 05/30/23 1345   Pulse 80 05/30/23 1356   Resp 19 05/30/23 1356   SpO2 94 % 05/30/23 1356   Vitals shown include unvalidated device data.    Electronically Signed By: Anitha Montejo MD  May 30, 2023  3:03 PM

## 2023-05-30 NOTE — ANESTHESIA CARE TRANSFER NOTE
Patient: Mallika Stuart    Procedure: Procedure(s):  HIATAL HERNIA REPAIR AND PARTIAL FUNDOPLICATION,  ROBOT-ASSISTED, LAPAROSCOPIC, USING DA CHELY XI       Diagnosis: Hiatal hernia [K44.9]  Diagnosis Additional Information: No value filed.    Anesthesia Type:   General     Note:    Oropharynx: oropharynx clear of all foreign objects  Level of Consciousness: drowsy  Oxygen Supplementation: face mask  Level of Supplemental Oxygen (L/min / FiO2): 5  Independent Airway: airway patency satisfactory and stable  Dentition: dentition unchanged  Vital Signs Stable: post-procedure vital signs reviewed and stable  Report to RN Given: handoff report given  Patient transferred to: PACU    Handoff Report: Identifed the Patient, Identified the Reponsible Provider, Reviewed the pertinent medical history, Discussed the surgical course, Reviewed Intra-OP anesthesia mangement and issues during anesthesia, Set expectations for post-procedure period and Allowed opportunity for questions and acknowledgement of understanding      Vitals:  Vitals Value Taken Time   /76 05/30/23 1256   Temp 36.3  C (97.3  F) 05/30/23 1255   Pulse 85 05/30/23 1255   Resp 20 05/30/23 1255   SpO2 96 % 05/30/23 1255   Vitals shown include unvalidated device data.    Electronically Signed By: JANE MCBRIDE CRNA  May 30, 2023  12:57 PM

## 2023-05-31 VITALS
RESPIRATION RATE: 16 BRPM | HEART RATE: 93 BPM | BODY MASS INDEX: 29.02 KG/M2 | DIASTOLIC BLOOD PRESSURE: 63 MMHG | WEIGHT: 170 LBS | TEMPERATURE: 97.7 F | SYSTOLIC BLOOD PRESSURE: 126 MMHG | OXYGEN SATURATION: 95 % | HEIGHT: 64 IN

## 2023-05-31 PROCEDURE — 250N000013 HC RX MED GY IP 250 OP 250 PS 637: Performed by: SURGERY

## 2023-05-31 PROCEDURE — 99024 POSTOP FOLLOW-UP VISIT: CPT | Performed by: SURGERY

## 2023-05-31 PROCEDURE — 250N000011 HC RX IP 250 OP 636: Performed by: SURGERY

## 2023-05-31 RX ORDER — SIMETHICONE 80 MG
80 TABLET,CHEWABLE ORAL EVERY 6 HOURS PRN
Qty: 10 TABLET | Refills: 0 | Status: SHIPPED | OUTPATIENT
Start: 2023-05-31

## 2023-05-31 RX ADMIN — BUPROPION HYDROCHLORIDE 150 MG: 150 TABLET, EXTENDED RELEASE ORAL at 08:15

## 2023-05-31 RX ADMIN — CETIRIZINE HYDROCHLORIDE 10 MG: 10 TABLET, FILM COATED ORAL at 08:15

## 2023-05-31 RX ADMIN — HYDROCODONE BITARTRATE AND ACETAMINOPHEN 1 TABLET: 5; 325 TABLET ORAL at 01:32

## 2023-05-31 RX ADMIN — GABAPENTIN 100 MG: 100 CAPSULE ORAL at 08:19

## 2023-05-31 RX ADMIN — AMLODIPINE BESYLATE 5 MG: 5 TABLET ORAL at 08:15

## 2023-05-31 RX ADMIN — ASPIRIN 81 MG: 81 TABLET, COATED ORAL at 08:15

## 2023-05-31 RX ADMIN — TRAMADOL HYDROCHLORIDE 50 MG: 50 TABLET, COATED ORAL at 06:23

## 2023-05-31 RX ADMIN — LEVOTHYROXINE SODIUM 88 MCG: 0.09 TABLET ORAL at 08:15

## 2023-05-31 RX ADMIN — HYDROCODONE BITARTRATE AND ACETAMINOPHEN 1 TABLET: 5; 325 TABLET ORAL at 09:21

## 2023-05-31 RX ADMIN — SIMETHICONE 80 MG: 80 TABLET, CHEWABLE ORAL at 08:14

## 2023-05-31 RX ADMIN — KETOROLAC TROMETHAMINE 30 MG: 30 INJECTION, SOLUTION INTRAMUSCULAR; INTRAVENOUS at 01:32

## 2023-05-31 ASSESSMENT — ACTIVITIES OF DAILY LIVING (ADL)
TOILETING_ISSUES: NO
ADLS_ACUITY_SCORE: 36
WEAR_GLASSES_OR_BLIND: YES
ADLS_ACUITY_SCORE: 36
CONCENTRATING,_REMEMBERING_OR_MAKING_DECISIONS_DIFFICULTY: NO
VISION_MANAGEMENT: GLASSES
WALKING_OR_CLIMBING_STAIRS_DIFFICULTY: NO
DRESSING/BATHING_DIFFICULTY: NO
CHANGE_IN_FUNCTIONAL_STATUS_SINCE_ONSET_OF_CURRENT_ILLNESS/INJURY: NO
ADLS_ACUITY_SCORE: 36
DIFFICULTY_EATING/SWALLOWING: NO
DOING_ERRANDS_INDEPENDENTLY_DIFFICULTY: NO
ADLS_ACUITY_SCORE: 36
ADLS_ACUITY_SCORE: 36

## 2023-05-31 NOTE — PROGRESS NOTES
Mallika Stuart is doing well, dizziness has resolved, tolerating full liquids, abdominal pain manageable        Vitals:    05/30/23 2100 05/31/23 0138 05/31/23 0141 05/31/23 0305   BP: 119/65   126/63   BP Location: Left arm   Left arm   Patient Position:       Cuff Size:       Pulse: 93   93   Resp: 14   16   Temp: 97.6  F (36.4  C)   97.7  F (36.5  C)   TempSrc: Oral   Oral   SpO2: 96% 97% 94% 95%   Weight:       Height:           PHYSICAL EXAM:  GEN: No acute distress, comfortable  ABD: Dressings intact abdomen soft  EXT:No cyanosis, edema or obvious abnormalities        No results for input(s): WBC, HGB, HCT, PLT in the last 168 hours.    No results for input(s): NA, CO2, BUN, CREATININE, ALBUMIN, BILITOT, ALKPHOS, ALT, AST, GLUCOSE in the last 168 hours.    Invalid input(s): K, CL, CALCIUM, LABALBU, PROT      A/P:  Mallika Stuart is s/p robotic hiatal hernia repair  -Okay to discharge to home today    Mark Rose, DO GAO  [phone]  [facility] Department of Surgery

## 2023-05-31 NOTE — PLAN OF CARE
Problem: Plan of Care - These are the overarching goals to be used throughout the patient stay.    Goal: Absence of Hospital-Acquired Illness or Injury  Intervention: Identify and Manage Fall Risk  Recent Flowsheet Documentation  Taken 5/30/2023 2335 by Efren Maravilla, RN  Safety Promotion/Fall Prevention:   activity supervised   clutter free environment maintained   nonskid shoes/slippers when out of bed   room near nurse's station   safety round/check completed  Taken 5/30/2023 1937 by Efren Maravilla, RN  Safety Promotion/Fall Prevention:   activity supervised   clutter free environment maintained   nonskid shoes/slippers when out of bed   room near nurse's station   safety round/check completed     Problem: Plan of Care - These are the overarching goals to be used throughout the patient stay.    Goal: Readiness for Transition of Care  Outcome: Progressing   Goal Outcome Evaluation:         VSS. Pt reports moderate incisional pain, improved after prn medication and ice packs. Band aids intact. Bowel sounds present and abdomen soft. Tolerating full liquids. Pt up to bathroom with SBA; reports dizziness has gone away. Denies nausea. Pt able to rest between cares.

## 2023-05-31 NOTE — PLAN OF CARE
Problem: Plan of Care - These are the overarching goals to be used throughout the patient stay.    Goal: Plan of Care Review  Description: The Plan of Care Review/Shift note should be completed every shift.  The Outcome Evaluation is a brief statement about your assessment that the patient is improving, declining, or no change.  This information will be displayed automatically on your shift note.  Outcome: Met   Goal Outcome Evaluation:       Patient ready for discharge. Denies dizziness. States understanding of discharge instructions.

## 2023-05-31 NOTE — PROGRESS NOTES
Patient arrived to [address] [place] [address]. Vital signs stable. Bowel sounds audible, normoactive in all four quadrants. Lungs clear, equal bilaterally. Patient up walking in room with stand by assist. Patient feels some light dizziness and light nausea when walking, seabands applied and sips of ice water taken. Able to void spontaneously, voided 500 ml upon arrival to unit. Patient arrived on 2 Liters NC, had to be titrated to 4 Liters NC due to oxygen saturations lowering. Patient experiencing intermittent gas and abdominal pain, simethicone given, and torodol and norco given per MAR. Change of shift report given to Efren CARTER RN

## 2023-05-31 NOTE — PROGRESS NOTES
Care Management Follow Up    Length of Stay (days): 0    Expected Discharge Date: 05/31/2023     Concerns to be Addressed: no discharge needs identified     Patient plan of care discussed at interdisciplinary rounds: Yes    Anticipated Discharge Disposition: Home     Anticipated Discharge Services: None  Anticipated Discharge DME: None    Patient/family educated on Medicare website which has current facility and service quality ratings: no  Education Provided on the Discharge Plan: No  Patient/Family in Agreement with the Plan: yes    Referrals Placed by CM/SW:  None   Private pay costs discussed: Not applicable    Additional Information:  Chart reviewed.  No CM needs identified.  Family to transport.       CAMI Delgado

## 2023-06-01 LAB
PATH REPORT.COMMENTS IMP SPEC: NORMAL
PATH REPORT.FINAL DX SPEC: NORMAL
PATH REPORT.GROSS SPEC: NORMAL
PATH REPORT.MICROSCOPIC SPEC OTHER STN: NORMAL
PATH REPORT.RELEVANT HX SPEC: NORMAL
PHOTO IMAGE: NORMAL

## 2023-06-12 ENCOUNTER — OFFICE VISIT (OUTPATIENT)
Dept: SURGERY | Facility: CLINIC | Age: 63
End: 2023-06-12
Payer: COMMERCIAL

## 2023-06-12 DIAGNOSIS — Z48.89 POSTOPERATIVE VISIT: Primary | ICD-10-CM

## 2023-06-12 PROCEDURE — 99024 POSTOP FOLLOW-UP VISIT: CPT | Performed by: SURGERY

## 2023-06-12 NOTE — LETTER
6/12/2023         RE: Mallika Stuart  [address] [address] [facility] [address]        Dear Colleague,    Thank you for referring your patient, Mallika Stuart, to the [facility] [place]. Please see a copy of my visit note below.     HPI: Mallika Stuart is here for follow up after robotic hiatal hernia repair.  She is doing relatively well with minimal abdominal pain.  She does have difficulty with eating more solid foods such as scrambled eggs although she did try this last week.  She is tolerating food substances such as cottage cheese and mashed potatoes.  She does complain of mild reflux but much improved since her surgery.  She does feel fatigue and has lost some weight.    Allergies, Medications, Social History, Past Medical History and Past Surgical History were reviewed and are noted in the chart.    There were no vitals taken for this visit.  There is no height or weight on file to calculate BMI.      EXAM:   GENERAL: Appears well  Abdomen-well-healed port site incisions    Incision/Surgical Site Lower;Right Abdomen (Active)       Incision/Surgical Site Lower;Right Abdomen (Active)       Incision/Surgical Site Left;Lower Abdomen (Active)       Incision/Surgical Site Left;Lower Flank (Active)       Incision/Surgical Site Upper Abdomen (Active)       Incision/Surgical Site 05/30/23 Abdomen (Active)       Assessment/Plan: Mallika Stuart continues to do well. Her wound is healing and overall progressing well.  At this point she will continue with slowly advancing her diet as tolerated.  We discussed eating hygiene which includes small bites and chewing her food well.  I will have her follow-up with me via phone visit in 1 month to ensure that she is progressing in the right direction.    Mark Rose DO [facility] Department of Surgery      Again, thank you for allowing me to participate in the care of your patient.        Sincerely,        Mark Rose DO

## 2023-06-12 NOTE — PROGRESS NOTES
HPI: Mallika Stuart is here for follow up after robotic hiatal hernia repair.  She is doing relatively well with minimal abdominal pain.  She does have difficulty with eating more solid foods such as scrambled eggs although she did try this last week.  She is tolerating food substances such as cottage cheese and mashed potatoes.  She does complain of mild reflux but much improved since her surgery.  She does feel fatigue and has lost some weight.    Allergies, Medications, Social History, Past Medical History and Past Surgical History were reviewed and are noted in the chart.    There were no vitals taken for this visit.  There is no height or weight on file to calculate BMI.      EXAM:   GENERAL: Appears well  Abdomen-well-healed port site incisions    Incision/Surgical Site Lower;Right Abdomen (Active)       Incision/Surgical Site Lower;Right Abdomen (Active)       Incision/Surgical Site Left;Lower Abdomen (Active)       Incision/Surgical Site Left;Lower Flank (Active)       Incision/Surgical Site Upper Abdomen (Active)       Incision/Surgical Site 05/30/23 Abdomen (Active)       Assessment/Plan: Mallika Stuart continues to do well. Her wound is healing and overall progressing well.  At this point she will continue with slowly advancing her diet as tolerated.  We discussed eating hygiene which includes small bites and chewing her food well.  I will have her follow-up with me via phone visit in 1 month to ensure that she is progressing in the right direction.    Mark Rose DO [facility] Department of Surgery

## 2023-07-10 ENCOUNTER — VIRTUAL VISIT (OUTPATIENT)
Dept: SURGERY | Facility: CLINIC | Age: 63
End: 2023-07-10
Payer: COMMERCIAL

## 2023-07-10 DIAGNOSIS — Z48.89 POSTOPERATIVE VISIT: Primary | ICD-10-CM

## 2023-07-10 PROCEDURE — 99024 POSTOP FOLLOW-UP VISIT: CPT | Performed by: SURGERY

## 2023-07-10 NOTE — LETTER
"    7/10/2023         RE: Mallika Stuart  [address] [address] [place] [id number]        Dear Colleague,    Thank you for referring your patient, Mallika Stuart, to the [facility] SUR[facility] CLINIC [facility] BAR[facility]ATRICS [facility]. Please see a copy of my visit note below.      The patient has been notified of following:     \"This telephone visit will be conducted via a call between you and your physician/provider. We have found that certain health care needs can be provided without the need for a physical exam.  This service lets us provide the care you need with a short phone conversation.  If a prescription is necessary we can send it directly to your pharmacy.  If lab work is needed we can place an order for that and you can then stop by our lab to have the test done at a later time.    Telephone visits are billed at different rates depending on your insurance coverage. During this emergency period, for some insurers they may be billed the same as an in-person visit.  Please reach out to your insurance provider with any questions.    If during the course of the call the physician/provider feels a telephone visit is not appropriate, you will not be charged for this service.\"    Patient has given verbal consent to a Telephone visit? Yes    What phone number would you like to be contacted at? [phone]    [facility]          Distant Location (provider location):  On-site    Additional provider notes: I spoke with Mrs. Stuart to discuss how she is progressing.  She is slowly advancing her diet and is able to tolerate grilled cheese sandwich.  At this point we will continue to monitor her symptoms and advance as tolerated.  I will have her follow-up with me at any time in the near future for any further issues, questions or concerns.      Phone call duration: 10 minutes    Demetri Rose DO, FACS  [facility] Surgery  [phone]          Again, thank you for allowing me to " participate in the care of your patient.        Sincerely,        Mark Rose, DO

## 2023-07-10 NOTE — PROGRESS NOTES
"  The patient has been notified of following:     \"This telephone visit will be conducted via a call between you and your physician/provider. We have found that certain health care needs can be provided without the need for a physical exam.  This service lets us provide the care you need with a short phone conversation.  If a prescription is necessary we can send it directly to your pharmacy.  If lab work is needed we can place an order for that and you can then stop by our lab to have the test done at a later time.    Telephone visits are billed at different rates depending on your insurance coverage. During this emergency period, for some insurers they may be billed the same as an in-person visit.  Please reach out to your insurance provider with any questions.    If during the course of the call the physician/provider feels a telephone visit is not appropriate, you will not be charged for this service.\"    Patient has given verbal consent to a Telephone visit? Yes    What phone number would you like to be contacted at? [phone]    [facility]          Distant Location (provider location):  On-site    Additional provider notes: I spoke with Mrs. Stuart to discuss how she is progressing.  She is slowly advancing her diet and is able to tolerate grilled cheese sandwich.  At this point we will continue to monitor her symptoms and advance as tolerated.  I will have her follow-up with me at any time in the near future for any further issues, questions or concerns.      Phone call duration: 10 minutes    Demetri Rose DO [facility]  [phone]      "

## 2023-07-26 RX ORDER — OMEPRAZOLE 40 MG/1
40 CAPSULE, DELAYED RELEASE ORAL DAILY
Qty: 90 CAPSULE | Refills: 1 | OUTPATIENT
Start: 2023-07-26

## 2024-07-27 ENCOUNTER — HEALTH MAINTENANCE LETTER (OUTPATIENT)
Age: 64
End: 2024-07-27

## 2024-08-28 NOTE — PROGRESS NOTES
NEW PATIENT OUTPATIENT VISIT     Chief complaint/Reason for visit: Decreased libido, vaginal concerns, family history of breast cancer    HPI: 64 year old  presents with above concerns.  She reports that intercourse has become increasingly painful over the past 6-12 months.  Penetration is painful, feels like her tissue is ripping open.  Has tried using KY jelly but not helpful.  Did see someone in the past year who gave her vaginal estrogen cream.  She tried it for a week but stopped bc it was too messy.     Patient is s/p hysterectomy (with BSO) in  for endometriosis.  She did take estradiol after that for hormone replacement.    Patient also reports that she is having trouble with BMs/constipation. She has been running on the constipated side lately and has to splint to pass stool.  This issue impacts her enjoyment of intercourse because she feels like the stool is being pushed out during penetration.    Patient also reports a history of breast cancer in her mother (age 80).  Patient herself has dense breasts and has had two breast biopsies.  She is wondering if she qualifies for screening MRIs.    Finally, reports a lot of stressors in her life--  with mild cognitive impairment, daughter with depression/anxiety, father in  who recently fell, had subdural hematoma, and had brain surgery (he is doing well now, back to living alone).  ------------------------------------------------------------------------  Review of Systems:     With the exception of any items noted in HPI, the remainder of the GI and  ROS is negative.    ------------------------------------------------------------------------    The medical, surgical, social and family histories were reviewed and updated.     ------------------------------------------------------------------------  Physical Exam:    Constitutional:   - /70 (BP Location: Right arm, Patient Position: Chair, Cuff Size: Adult Regular)   Pulse 76   Resp  "18   Ht 1.626 m (5' 4\")   Wt 62.1 kg (137 lb)   BMI 23.52 kg/m    - General Appearance: pleasant, well-appearing, NAD    Cardiovascular:  - Extremities: no edema or calf tenderness    Genitourinary/Female Genitalia:  - EGBUS: atrophic-appearing external genitalia and perineum.  No lesions/fissuring present.  - Vagina: atrophic appearing vaginal mucosa and discharge  - Cervix: absent  - Bladder/Urethra: nontender, without masses  - Uterus/Adnexa: no masses or tenderness appreciated    Abdomen/GI:  - Perineum/Anus/Rectum: normal appearing, no hemorrhoids    ------------------------------------------------------------------------  Assessment/Plan:  64 year old with painful intercourse, vaginal atrophy, symptomatic rectocele, at risk for breast cancer, life stressors.    1) Painful intercourse/vaginal atrophy:  -- discussed management options: nonhormonal vs. Hormonal, lubricants.  Reviewed pros/cons, risks benefits.  Patient interested in reattempting vaginal estrogen but would like in different form.  -- Plan Yuvafem 2x weekly, OK to use leftover estrogen cream externally around vaginal opening in small amounts (pea sized dab).  -- Recommended better lubricants: Uberlube (silicone), Slippery Stuff (water), Good Clean Love (water)    2) Symptomatic rectocele:  -- Reviewed management of constipation, advised contacting PCP for additional advice  -- Urogyn referral to discuss rectocele repair if remains symptomatic    3) At risk for breast cancer: referred for cancer genetics assessment    4) Life stressors: discussed seeing therapist, was already considering and will seek appointment with one she is already familiar with.  Support provided.    Total time of 45 minutes spent reviewing prior records, evaluating and counseling the patient and ordering follow up tests.      Jada Michaud MD   "

## 2024-08-29 ENCOUNTER — OFFICE VISIT (OUTPATIENT)
Dept: OBGYN | Facility: CLINIC | Age: 64
End: 2024-08-29
Payer: COMMERCIAL

## 2024-08-29 VITALS
WEIGHT: 137 LBS | HEART RATE: 76 BPM | HEIGHT: 64 IN | BODY MASS INDEX: 23.39 KG/M2 | DIASTOLIC BLOOD PRESSURE: 70 MMHG | RESPIRATION RATE: 18 BRPM | SYSTOLIC BLOOD PRESSURE: 116 MMHG

## 2024-08-29 DIAGNOSIS — Z80.3 FAMILY HISTORY OF MALIGNANT NEOPLASM OF BREAST: ICD-10-CM

## 2024-08-29 DIAGNOSIS — Z98.890 HISTORY OF BENIGN BREAST BIOPSY: ICD-10-CM

## 2024-08-29 DIAGNOSIS — R92.30 DENSE BREAST: ICD-10-CM

## 2024-08-29 DIAGNOSIS — N81.6 RECTOCELE: ICD-10-CM

## 2024-08-29 DIAGNOSIS — N95.2 VAGINAL ATROPHY: Primary | ICD-10-CM

## 2024-08-29 DIAGNOSIS — N94.11 SUPERFICIAL DYSPAREUNIA: ICD-10-CM

## 2024-08-29 PROCEDURE — 99204 OFFICE O/P NEW MOD 45 MIN: CPT | Performed by: OBSTETRICS & GYNECOLOGY

## 2024-08-29 RX ORDER — ESTRADIOL 10 UG/1
10 INSERT VAGINAL
Qty: 24 TABLET | Refills: 3 | Status: SHIPPED | OUTPATIENT
Start: 2024-08-29

## 2024-08-29 NOTE — NURSING NOTE
"Initial /70 (BP Location: Right arm, Patient Position: Chair, Cuff Size: Adult Regular)   Pulse 76   Resp 18   Ht 1.626 m (5' 4\")   Wt 62.1 kg (137 lb)   BMI 23.52 kg/m   Estimated body mass index is 23.52 kg/m  as calculated from the following:    Height as of this encounter: 1.626 m (5' 4\").    Weight as of this encounter: 62.1 kg (137 lb). .    "

## 2024-08-29 NOTE — PATIENT INSTRUCTIONS
Work on constipation-- can check with PCP on strategies.    Schedule consult with Urogynecology for rectocele.    Start vaginal estrogen pill (Yuvafem) twice weekly.  Can use small dab of vaginal estrogen cream on external tissues around vaginal opening as well.    Lubricants to try:  Uberlube (silicone)  Slippery Stuff (water)  Good Clean Love (water)    Referral placed for genetic counseling for breast cancer risk assessment and recommendations.

## 2024-08-30 ENCOUNTER — PATIENT OUTREACH (OUTPATIENT)
Dept: ONCOLOGY | Facility: CLINIC | Age: 64
End: 2024-08-30
Payer: COMMERCIAL

## 2024-08-30 NOTE — PROGRESS NOTES
New Patient Oncology Nurse Navigator Note     [facility]     Referred to (specialty):  Genetic Counseling    Date Referral Received: 8/29/2024     Evaluation for: Patient also reports a history of breast cancer in her mother (age 80). Patient herself has dense breasts and has had two breast biopsies. She is wondering if she qualifies for screening MRIs.

## 2024-10-17 ENCOUNTER — HOSPITAL ENCOUNTER (EMERGENCY)
Facility: CLINIC | Age: 64
Discharge: HOME | End: 2024-10-17
Attending: EMERGENCY MEDICINE | Admitting: EMERGENCY MEDICINE
Payer: COMMERCIAL

## 2024-10-17 ENCOUNTER — APPOINTMENT (OUTPATIENT)
Dept: CT IMAGING | Facility: CLINIC | Age: 64
End: 2024-10-17
Attending: EMERGENCY MEDICINE
Payer: COMMERCIAL

## 2024-10-17 VITALS
HEART RATE: 63 BPM | HEIGHT: 64 IN | BODY MASS INDEX: 22.88 KG/M2 | OXYGEN SATURATION: 99 % | SYSTOLIC BLOOD PRESSURE: 112 MMHG | RESPIRATION RATE: 18 BRPM | WEIGHT: 134 LBS | TEMPERATURE: 97.4 F | DIASTOLIC BLOOD PRESSURE: 63 MMHG

## 2024-10-17 DIAGNOSIS — R10.33 PERIUMBILICAL ABDOMINAL PAIN: ICD-10-CM

## 2024-10-17 LAB
ALBUMIN SERPL BCG-MCNC: 4.3 G/DL (ref 3.5–5.2)
ALBUMIN UR-MCNC: NEGATIVE MG/DL
ALP SERPL-CCNC: 58 U/L (ref 40–150)
ALT SERPL W P-5'-P-CCNC: 25 U/L (ref 0–50)
ANION GAP SERPL CALCULATED.3IONS-SCNC: 12 MMOL/L (ref 7–15)
APPEARANCE UR: ABNORMAL
AST SERPL W P-5'-P-CCNC: 29 U/L (ref 0–45)
BACTERIA #/AREA URNS HPF: ABNORMAL /HPF
BASOPHILS # BLD AUTO: 0 10E3/UL (ref 0–0.2)
BASOPHILS NFR BLD AUTO: 1 %
BILIRUB SERPL-MCNC: 0.5 MG/DL
BILIRUB UR QL STRIP: NEGATIVE
BUN SERPL-MCNC: 9.9 MG/DL (ref 8–23)
CALCIUM SERPL-MCNC: 10.2 MG/DL (ref 8.8–10.4)
CHLORIDE SERPL-SCNC: 102 MMOL/L (ref 98–107)
COLOR UR AUTO: YELLOW
CREAT SERPL-MCNC: 0.75 MG/DL (ref 0.51–0.95)
EGFRCR SERPLBLD CKD-EPI 2021: 88 ML/MIN/1.73M2
EOSINOPHIL # BLD AUTO: 0.1 10E3/UL (ref 0–0.7)
EOSINOPHIL NFR BLD AUTO: 1 %
ERYTHROCYTE [DISTWIDTH] IN BLOOD BY AUTOMATED COUNT: 12.4 % (ref 10–15)
GLUCOSE SERPL-MCNC: 98 MG/DL (ref 70–99)
GLUCOSE UR STRIP-MCNC: NEGATIVE MG/DL
HCO3 SERPL-SCNC: 25 MMOL/L (ref 22–29)
HCT VFR BLD AUTO: 37.6 % (ref 35–47)
HGB BLD-MCNC: 12.7 G/DL (ref 11.7–15.7)
HGB UR QL STRIP: NEGATIVE
HOLD SPECIMEN: NORMAL
IMM GRANULOCYTES # BLD: 0 10E3/UL
IMM GRANULOCYTES NFR BLD: 0 %
KETONES UR STRIP-MCNC: NEGATIVE MG/DL
LEUKOCYTE ESTERASE UR QL STRIP: NEGATIVE
LYMPHOCYTES # BLD AUTO: 1.3 10E3/UL (ref 0.8–5.3)
LYMPHOCYTES NFR BLD AUTO: 31 %
MCH RBC QN AUTO: 31 PG (ref 26.5–33)
MCHC RBC AUTO-ENTMCNC: 33.8 G/DL (ref 31.5–36.5)
MCV RBC AUTO: 92 FL (ref 78–100)
MONOCYTES # BLD AUTO: 0.4 10E3/UL (ref 0–1.3)
MONOCYTES NFR BLD AUTO: 10 %
MUCOUS THREADS #/AREA URNS LPF: PRESENT /LPF
NEUTROPHILS # BLD AUTO: 2.5 10E3/UL (ref 1.6–8.3)
NEUTROPHILS NFR BLD AUTO: 58 %
NITRATE UR QL: NEGATIVE
NRBC # BLD AUTO: 0 10E3/UL
NRBC BLD AUTO-RTO: 0 /100
PH UR STRIP: 7 [PH] (ref 5–7)
PLATELET # BLD AUTO: 197 10E3/UL (ref 150–450)
POTASSIUM SERPL-SCNC: 3.8 MMOL/L (ref 3.4–5.3)
PROT SERPL-MCNC: 6.9 G/DL (ref 6.4–8.3)
RBC # BLD AUTO: 4.1 10E6/UL (ref 3.8–5.2)
RBC URINE: <1 /HPF
SODIUM SERPL-SCNC: 139 MMOL/L (ref 135–145)
SP GR UR STRIP: 1.01 (ref 1–1.03)
SQUAMOUS EPITHELIAL: 1 /HPF
UROBILINOGEN UR STRIP-MCNC: NORMAL MG/DL
WBC # BLD AUTO: 4.3 10E3/UL (ref 4–11)
WBC URINE: 8 /HPF

## 2024-10-17 PROCEDURE — 36415 COLL VENOUS BLD VENIPUNCTURE: CPT | Performed by: EMERGENCY MEDICINE

## 2024-10-17 PROCEDURE — 250N000009 HC RX 250: Performed by: EMERGENCY MEDICINE

## 2024-10-17 PROCEDURE — 250N000011 HC RX IP 250 OP 636: Performed by: EMERGENCY MEDICINE

## 2024-10-17 PROCEDURE — 74177 CT ABD & PELVIS W/CONTRAST: CPT

## 2024-10-17 PROCEDURE — 99285 EMERGENCY DEPT VISIT HI MDM: CPT | Mod: 25 | Performed by: EMERGENCY MEDICINE

## 2024-10-17 PROCEDURE — 82040 ASSAY OF SERUM ALBUMIN: CPT | Performed by: EMERGENCY MEDICINE

## 2024-10-17 PROCEDURE — 87086 URINE CULTURE/COLONY COUNT: CPT | Performed by: EMERGENCY MEDICINE

## 2024-10-17 PROCEDURE — 99284 EMERGENCY DEPT VISIT MOD MDM: CPT | Performed by: EMERGENCY MEDICINE

## 2024-10-17 PROCEDURE — 85018 HEMOGLOBIN: CPT | Performed by: EMERGENCY MEDICINE

## 2024-10-17 PROCEDURE — 81001 URINALYSIS AUTO W/SCOPE: CPT | Performed by: EMERGENCY MEDICINE

## 2024-10-17 PROCEDURE — 85004 AUTOMATED DIFF WBC COUNT: CPT | Performed by: EMERGENCY MEDICINE

## 2024-10-17 RX ORDER — IOPAMIDOL 755 MG/ML
100 INJECTION, SOLUTION INTRAVASCULAR ONCE
Status: COMPLETED | OUTPATIENT
Start: 2024-10-17 | End: 2024-10-17

## 2024-10-17 RX ADMIN — SODIUM CHLORIDE 56 ML: 9 INJECTION, SOLUTION INTRAVENOUS at 15:49

## 2024-10-17 RX ADMIN — IOPAMIDOL 100 ML: 755 INJECTION, SOLUTION INTRAVENOUS at 15:49

## 2024-10-17 ASSESSMENT — ACTIVITIES OF DAILY LIVING (ADL)
ADLS_ACUITY_SCORE: 35
ADLS_ACUITY_SCORE: 35
ADLS_ACUITY_SCORE: 33
ADLS_ACUITY_SCORE: 35

## 2024-10-17 ASSESSMENT — COLUMBIA-SUICIDE SEVERITY RATING SCALE - C-SSRS
2. HAVE YOU ACTUALLY HAD ANY THOUGHTS OF KILLING YOURSELF IN THE PAST MONTH?: NO
2. HAVE YOU ACTUALLY HAD ANY THOUGHTS OF KILLING YOURSELF IN THE PAST MONTH?: NO
1. IN THE PAST MONTH, HAVE YOU WISHED YOU WERE DEAD OR WISHED YOU COULD GO TO SLEEP AND NOT WAKE UP?: NO
6. HAVE YOU EVER DONE ANYTHING, STARTED TO DO ANYTHING, OR PREPARED TO DO ANYTHING TO END YOUR LIFE?: NO
1. IN THE PAST MONTH, HAVE YOU WISHED YOU WERE DEAD OR WISHED YOU COULD GO TO SLEEP AND NOT WAKE UP?: NO
6. HAVE YOU EVER DONE ANYTHING, STARTED TO DO ANYTHING, OR PREPARED TO DO ANYTHING TO END YOUR LIFE?: NO

## 2024-10-17 NOTE — ED NOTES
There were no vitals taken for this visit.    Mallika Stuart is a 64-year-old female presenting with complaints of intermittent sharp RLQ pain ongoing for the past week and associated constipation.  Given patient's history, I recommended he/she be evaluated in the emergency department.  We discussed that he/she will likely require further testing and/or treatment beyond the capabilities of the current urgent care setting including labs and potential imaging.  Patient expresses understanding of this and agreement with the plan.         Christina Gill PA-C  10/17/24 [id number]

## 2024-10-17 NOTE — DISCHARGE INSTRUCTIONS
Return if symptoms worsen or new symptoms develop.  Follow-up with primary care physician next available.  Drink plenty of fluids take ibuprofen or Tylenol for pain.  We did send off a urine culture.  If worsening symptoms including vomiting fever worsening abdominal pain decreased urine output blood in stool or other symptoms present please return for further evaluation and care.

## 2024-10-17 NOTE — ED TRIAGE NOTES
Pt here with abdominal pain since Saturday     Triage Assessment (Adult)       Row Name 10/17/24 1307 10/17/24 1237       Triage Assessment    Airway WDL WDL WDL       Respiratory WDL    Respiratory WDL WDL WDL       Skin Circulation/Temperature WDL    Skin Circulation/Temperature WDL WDL --       Cardiac WDL    Cardiac WDL WDL --       Peripheral/Neurovascular WDL    Peripheral Neurovascular WDL WDL WDL       Cognitive/Neuro/Behavioral WDL    Cognitive/Neuro/Behavioral WDL WDL --       Ivon Coma Scale    Best Eye Response 4-->(E4) spontaneous --    Best Motor Response 6-->(M6) obeys commands --    Best Verbal Response 5-->(V5) oriented --    Ivon Coma Scale Score 15 --

## 2024-10-17 NOTE — ED TRIAGE NOTES
Pt started with RLQ pain last night, now moved to pain all over abdomen     Triage Assessment (Adult)       Row Name 10/17/24 1237          Triage Assessment    Airway WDL WDL        Respiratory WDL    Respiratory WDL WDL        Peripheral/Neurovascular WDL    Peripheral Neurovascular WDL WDL

## 2024-10-17 NOTE — ED PROVIDER NOTES
History     Chief Complaint   Patient presents with    Abdominal Pain     HPI  Mallika Stuart is a 64 year old female with past medical history significant for GERD thyroid disease, previous appendectomy who presents to the emergency department complaining of abdominal pain since Saturday.  Patient states she has some abdominal pain in the right lower quadrant tenderness persisted.  She thought she had constipation so took a stool softener and had normal bowel movement.  Pain then presented as more periumbilical pain right greater than left over the last couple days is constantly there does not change with activity denies any fevers or chills has not had any headache or visual changes.  Denies any neck pain has not had any chest pain or shortness of breath.  Has not any blood in her stool denies any urinary symptoms.  Has not had bowel or bladder dysfunction denies calf pain leg swelling or rash.    Allergies:  Allergies   Allergen Reactions    Statins Muscle Pain (Myalgia)       Problem List:    Patient Active Problem List    Diagnosis Date Noted    Gastroesophageal reflux disease without esophagitis 01/19/2023     Priority: Medium     Added automatically from request for surgery [id number]          Past Medical History:    Past Medical History:   Diagnosis Date    Arrhythmia     Difficulty walking     Gastroesophageal reflux disease     Gastroesophageal reflux disease without esophagitis 1/19/2023    Heart murmur     Irregular heart beat     PONV (postoperative nausea and vomiting)     Thyroid disease        Past Surgical History:    Past Surgical History:   Procedure Laterality Date    ESOPHAGOSCOPY, GASTROSCOPY, DUODENOSCOPY (EGD), COMBINED N/A 2/14/2023    Procedure: ESOPHAGOGASTRODUODENOSCOPY, WITH BIOPSY;  Surgeon: Mark Rose DO;  Location: [facility]    LAPAROSCOPIC NISSEN FUNDOPLICATION N/A 5/30/2023    Procedure: HIATAL HERNIA REPAIR AND PARTIAL FUNDOPLICATION,  ROBOT-ASSISTED,  "LAPAROSCOPIC, USING DA CHELY XI;  Surgeon: Mark Rose DO;  Location: [facility] OR       Family History:    No family history on file.    Social History:  Marital Status:   [2]  Social History     Tobacco Use    Smoking status: Never    Smokeless tobacco: Never   Vaping Use    Vaping status: Never Used   Substance Use Topics    Alcohol use: Yes     Comment: occas.    Drug use: Never        Medications:    acetaminophen (TYLENOL) 325 MG tablet  amLODIPine (NORVASC) 5 MG tablet  aspirin (ASA) 81 MG EC tablet  buPROPion (WELLBUTRIN XL) 150 MG 24 hr tablet  calcium citrate (CITRACAL) 950 (200 Ca) MG tablet  cetirizine (ZYRTEC) 10 MG tablet  cyclobenzaprine (FLEXERIL) 10 MG tablet  docusate sodium (COLACE) 100 MG capsule  estradiol (VAGIFEM) 10 MCG TABS vaginal tablet  levothyroxine (SYNTHROID/LEVOTHROID) 88 MCG tablet  metoprolol succinate ER (TOPROL XL) 200 MG 24 hr tablet  metoprolol succinate ER (TOPROL XL) 50 MG 24 hr tablet  rosuvastatin (CRESTOR) 20 MG tablet  simethicone (MYLICON) 80 MG chewable tablet  traZODone (DESYREL) 50 MG tablet  vitamin B-12 (CYANOCOBALAMIN) 1000 MCG tablet  Vitamin D3 (CHOLECALCIFEROL) 25 mcg (1000 units) tablet          Review of Systems  As per HPI  Physical Exam   BP: (!) 145/84  Pulse: 62  Temp: 97.2  F (36.2  C)  Resp: 16  Height: 162.6 cm (5' 4\")  Weight: 60.8 kg (134 lb)  SpO2: 100 %      Physical Exam  Vitals and nursing note reviewed.   Constitutional:       Appearance: She is well-developed.   HENT:      Head: Normocephalic and atraumatic.   Eyes:      Extraocular Movements: Extraocular movements intact.      Pupils: Pupils are equal, round, and reactive to light.   Cardiovascular:      Rate and Rhythm: Normal rate and regular rhythm.      Heart sounds: Normal heart sounds.   Pulmonary:      Effort: Pulmonary effort is normal.      Breath sounds: Normal breath sounds. No stridor. No wheezing or rhonchi.   Abdominal:      Comments: Soft, nondistended, " tender to palpation of the umbilical region right side and left side.  No guarding or rebound present bowel sounds positive no upper abdominal pain.  No masses or hernia palpated.  There is no flank pain present.  Bowel sounds are positive.   Musculoskeletal:         General: Normal range of motion.      Right lower leg: No edema.      Left lower leg: No edema.   Skin:     General: Skin is warm and dry.      Findings: No rash.   Neurological:      General: No focal deficit present.      Mental Status: She is alert and oriented to person, place, and time.      Sensory: No sensory deficit.      Motor: No weakness.      Coordination: Coordination normal.   Psychiatric:         Mood and Affect: Mood normal.         ED Course        Procedures              Critical Care time:  none              Results for orders placed or performed during the hospital encounter of 10/17/24 (from the past 24 hour(s))   [place] [facility] *Canceled*    Narrative    The following orders were created for panel order [facility].  Procedure                               Abnormality         Status                     ---------                               -----------         ------                       Please view results for these tests on the individual orders.   [facility]    Narrative    The following orders were created for panel order [facility].  Procedure                               Abnormality         Status                     ---------                               -----------         ------                     Extra Blue Top Tube[[id number]]                              In process                   Please view results for these tests on the individual orders.   CBC with platelets, differential    Narrative    The following orders were created for panel order CBC with platelets, differential.  Procedure                               Abnormality         Status                     ---------                               -----------          ------                     CBC with platelets and d...[[id number]]                      Final result                 Please view results for these tests on the individual orders.   CBC with platelets and differential   Result Value Ref Range    WBC Count 4.3 4.0 - 11.0 10e3/uL    RBC Count 4.10 3.80 - 5.20 10e6/uL    Hemoglobin 12.7 11.7 - 15.7 g/dL    Hematocrit 37.6 35.0 - 47.0 %    MCV 92 78 - 100 fL    MCH 31.0 26.5 - 33.0 pg    MCHC 33.8 31.5 - 36.5 g/dL    RDW 12.4 10.0 - 15.0 %    Platelet Count 197 150 - 450 10e3/uL    % Neutrophils 58 %    % Lymphocytes 31 %    % Monocytes 10 %    % Eosinophils 1 %    % Basophils 1 %    % Immature Granulocytes 0 %    NRBCs per 100 WBC 0 <1 /100    Absolute Neutrophils 2.5 1.6 - 8.3 10e3/uL    Absolute Lymphocytes 1.3 0.8 - 5.3 10e3/uL    Absolute Monocytes 0.4 0.0 - 1.3 10e3/uL    Absolute Eosinophils 0.1 0.0 - 0.7 10e3/uL    Absolute Basophils 0.0 0.0 - 0.2 10e3/uL    Absolute Immature Granulocytes 0.0 <=0.4 10e3/uL    Absolute NRBCs 0.0 10e3/uL       Medications   iopamidol (ISOVUE-370) solution 100 mL (100 mLs Intravenous $Given 10/17/24 1542)   sodium chloride 0.9 % bag 500mL for CT scan flush use (56 mLs Intravenous $Given 10/17/24 1549)       Assessments & Plan (with Medical Decision Making) records were reviewed including past medical history medications and allergies.  Medicare wellness visit from 4/24/2024 was reviewed.  Labs were obtained.  Independently reviewed and interpreted labs.  CBC and comprehensive metabolic panel were unremarkable.  UA few bacteria 8 WBCs but without nitrates or leukocyte Estrace.  Patient is not having urinary symptoms but a culture was sent.  Due to patient's history and exam a CT scan abdomen pelvis was obtained.  Independently reviewed this and agree with radiologist no acute findings in the abdomen and pelvis there was a fat-containing umbilical hernia present.  Findings discussed with patient and .  No obvious acute  abnormality in abdomen found.  No significant evidence of constipation or obstruction or inflammation.  Advised patient to try ibuprofen and Tylenol and return if worsening abdominal pain fevers decreased urine output or blood in stool.  Patient will be contacted if urine culture returns positive.  Patient feels comfortable with this plan.     I have reviewed the nursing notes.    I have reviewed the findings, diagnosis, plan and need for follow up with the patient.             Discharge Medication List as of 10/17/2024  6:01 PM          Final diagnoses:   Periumbilical abdominal pain       10/17/2024   [facility] EMERGENCY DEPT       Ranjeet Petty MD  10/17/24 2025

## 2024-10-18 LAB — BACTERIA UR CULT: NO GROWTH

## 2024-10-21 ENCOUNTER — VIRTUAL VISIT (OUTPATIENT)
Dept: UROLOGY | Facility: CLINIC | Age: 64
End: 2024-10-21
Payer: COMMERCIAL

## 2024-10-21 DIAGNOSIS — N95.8 GENITOURINARY SYNDROME OF MENOPAUSE: ICD-10-CM

## 2024-10-21 DIAGNOSIS — N94.10 DYSPAREUNIA, FEMALE: Primary | ICD-10-CM

## 2024-10-21 DIAGNOSIS — N81.6 RECTOCELE: ICD-10-CM

## 2024-10-21 PROCEDURE — 99204 OFFICE O/P NEW MOD 45 MIN: CPT | Mod: 95 | Performed by: UROLOGY

## 2024-10-21 NOTE — PATIENT INSTRUCTIONS
-continue vaginal estrogen  -f/u in clinic for an exam for further discuss management.  -in the meantime better constipation management with miralax and better hydration.

## 2024-10-21 NOTE — PROGRESS NOTES
HPI:  Mallika Stuart is a 64 year old female with a rectocele that has been going on for a year.  She finds it uncomfortable and more difficult to have intercourse.  She is even splinting.  Patient is s/p hysterectomy (with BSO) in 1990 for endometriosis.  She did take estradiol after that for hormone replacement, vaginal suppository.  She has constipation, she takes a stool softener twice per day but doesn't drink much water.    Reviewed previous notes from Dr. Jada Michaud from 8/29/24.    Exam:  There were no vitals taken for this visit.  GENERAL: alert and no distress  EYES: Eyes grossly normal to inspection.  No discharge or erythema, or obvious scleral/conjunctival abnormalities.  RESP: No audible wheeze, cough, or visible cyanosis.    SKIN: Visible skin clear. No significant rash, abnormal pigmentation or lesions.  NEURO: Cranial nerves grossly intact.  Mentation and speech appropriate for age.  PSYCH: Appropriate affect, tone, and pace of words      Review of Labs:  The following labs were reviewed by me and discussed with the patient:  Lab Results   Component Value Date    WBC 4.3 10/17/2024     Lab Results   Component Value Date    RBC 4.10 10/17/2024     Lab Results   Component Value Date    HGB 12.7 10/17/2024     Lab Results   Component Value Date    HCT 37.6 10/17/2024     Lab Results   Component Value Date    MCV 92 10/17/2024     Lab Results   Component Value Date    MCH 31.0 10/17/2024     Lab Results   Component Value Date    MCHC 33.8 10/17/2024     Lab Results   Component Value Date    RDW 12.4 10/17/2024     Lab Results   Component Value Date     10/17/2024        Last Comprehensive Metabolic Panel:  Sodium   Date Value Ref Range Status   10/17/2024 139 135 - 145 mmol/L Final     Potassium   Date Value Ref Range Status   10/17/2024 3.8 3.4 - 5.3 mmol/L Final     Chloride   Date Value Ref Range Status   10/17/2024 102 98 - 107 mmol/L Final     Carbon Dioxide (CO2)   Date Value Ref Range  I had the pleasure of seeing Felisa Starks for followup in the Liver Clinic at the [facility] on 11/01/2017.  Ms. Starks returns for followup of cirrhosis caused by chronic hepatitis C.  She is a sustained virologic responder to hepatitis C treatment.        For the most part, she feels well.  Her only complaint is some left-sided abdominal pain when she is bending or cleaning.  It can be quite severe at times.  It is definitely not related to meals and is only positional.  She does occasionally note some right upper quadrant pain, as well as that is certainly not related to meals and tends to be somewhat positional.  She denies any itching, skin rash or fatigue.  She denies any increased abdominal girth or lower extremity edema.  She denies any fevers or chills, cough or shortness of breath.  She denies any nausea, vomiting with diarrhea or constipation.  Her appetite has been good, and her weight is down about 6 pounds since she was last seen, not intentionally.  She is working full-time in a head start program.     Current Outpatient Prescriptions   Medication     fluticasone (FLOVENT HFA) 220 MCG/ACT Inhaler     loperamide (IMODIUM A-D) 2 MG tablet     Ascorbic Acid (VITAMIN C) 500 MG CHEW     Calcium Carb-Cholecalciferol (CALCIUM 500 + D3 PO)     Multiple Vitamins-Minerals (MULTIVITAMIN ADULT PO)     No current facility-administered medications for this visit.      B/P: 129/87, T: 97.7, P: 74, R: 18    HEENT exam shows no scleral icterus and no temporal muscle wasting.  Her chest is clear.  Abdominal exam shows no increase in girth.  There is some mild tenderness to palpation in the left upper quadrant and mild tenderness to palpation in the right upper quadrant to the epigastrium.  No masses are noted.  Her liver is 10 cm in span without left lobe enlargement.  No spleen tip is palpable, and extremity exam shows no edema.  Skin exam shows some palmar erythema without spider angioma.   Neurologic exam shows no asterixis.     Recent Results (from the past 168 hour(s))    Hepatic Panel [LAB20]    Collection Time: 11/01/17  9:39 AM   Result Value Ref Range    Bilirubin Direct 0.3 (H) 0.0 - 0.2 mg/dL    Bilirubin Total 1.4 (H) 0.2 - 1.3 mg/dL    Albumin 3.8 3.4 - 5.0 g/dL    Protein Total 6.9 6.8 - 8.8 g/dL    Alkaline Phosphatase 84 40 - 150 U/L    ALT 54 (H) 0 - 50 U/L    AST 44 0 - 45 U/L   Basic metabolic panel [LAB15]    Collection Time: 11/01/17  9:39 AM   Result Value Ref Range    Sodium 142 133 - 144 mmol/L    Potassium 4.0 3.4 - 5.3 mmol/L    Chloride 108 94 - 109 mmol/L    Carbon Dioxide 28 20 - 32 mmol/L    Anion Gap 7 3 - 14 mmol/L    Glucose 128 (H) 70 - 99 mg/dL    Urea Nitrogen 12 7 - 30 mg/dL    Creatinine 0.70 0.52 - 1.04 mg/dL    GFR Estimate 84 >60 mL/min/1.7m2    GFR Estimate If Black >90 >60 mL/min/1.7m2    Calcium 8.5 8.5 - 10.1 mg/dL   CBC with platelets [LQU827]    Collection Time: 11/01/17  9:39 AM   Result Value Ref Range    WBC 3.1 (L) 4.0 - 11.0 10e9/L    RBC Count 4.65 3.8 - 5.2 10e12/L    Hemoglobin 13.6 11.7 - 15.7 g/dL    Hematocrit 41.7 35.0 - 47.0 %    MCV 90 78 - 100 fl    MCH 29.2 26.5 - 33.0 pg    MCHC 32.6 31.5 - 36.5 g/dL    RDW 13.1 10.0 - 15.0 %    Platelet Count 103 (L) 150 - 450 10e9/L   INR [LON5520]    Collection Time: 11/01/17  9:39 AM   Result Value Ref Range    INR 1.13 0.86 - 1.14   AFP tumor marker [SIL864]    Collection Time: 11/01/17  9:39 AM   Result Value Ref Range    Alpha Fetoprotein 3.7 0 - 8 ug/L      I did review her ultrasound which shows no mass lesions in the liver.  She has some mild splenomegaly and no ascites is noted.      My impression is that Ms. Starks has cirrhosis caused by chronic hepatitis C.  She is a sustained virologic responder to hepatitis C treatment.  She declined a flu vaccine today.  She will be due for a Prevnar 13 vaccine in approximately 6 months.  She is going to get am upper GI endoscopy by Dr. Leach in March.  Status   10/17/2024 25 22 - 29 mmol/L Final     Anion Gap   Date Value Ref Range Status   10/17/2024 12 7 - 15 mmol/L Final     Glucose   Date Value Ref Range Status   10/17/2024 98 70 - 99 mg/dL Final     Urea Nitrogen   Date Value Ref Range Status   10/17/2024 9.9 8.0 - 23.0 mg/dL Final     Creatinine   Date Value Ref Range Status   10/17/2024 0.75 0.51 - 0.95 mg/dL Final     GFR Estimate   Date Value Ref Range Status   10/17/2024 88 >60 mL/min/1.73m2 Final     Comment:     eGFR calculated using 2021 CKD-EPI equation.     Calcium   Date Value Ref Range Status   10/17/2024 10.2 8.8 - 10.4 mg/dL Final     Comment:     Reference intervals for this test were updated on 7/16/2024 to reflect our healthy population more accurately. There may be differences in the flagging of prior results with similar values performed with this method. Those prior results can be interpreted in the context of the updated reference intervals.     Bilirubin Total   Date Value Ref Range Status   10/17/2024 0.5 <=1.2 mg/dL Final     Alkaline Phosphatase   Date Value Ref Range Status   10/17/2024 58 40 - 150 U/L Final     ALT   Date Value Ref Range Status   10/17/2024 25 0 - 50 U/L Final     AST   Date Value Ref Range Status   10/17/2024 29 0 - 45 U/L Final                Color Urine (no units)   Date Value   10/17/2024 Yellow     Appearance Urine (no units)   Date Value   10/17/2024 Slightly Cloudy (A)     Glucose Urine (mg/dL)   Date Value   10/17/2024 Negative     Bilirubin Urine (no units)   Date Value   10/17/2024 Negative     Ketones Urine (mg/dL)   Date Value   10/17/2024 Negative     Specific Gravity Urine (no units)   Date Value   10/17/2024 1.006     pH Urine (no units)   Date Value   10/17/2024 7.0     Protein Albumin Urine (mg/dL)   Date Value   10/17/2024 Negative     Nitrite Urine (no units)   Date Value   10/17/2024 Negative     Leukocyte Esterase Urine (no units)   Date Value   10/17/2024 Negative          Assessment & Plan    65 y/o female with a rectocele that is bothered by it and causes splinting and dyspareunia.  She also constipation and GSM.  We discussed some options for a rectocele repair including observation, pessary and surgery but would start with an exam.  -continue vaginal estrogen  -f/u in clinic for an exam for further discuss management.  -in the meantime better constipation management with miralax and better hydration.    Pao Webb MD  [facility]      She is otherwise up-to-date with her other cancer screening, and I will see her back in the clinic here again in 6 months.      Thank you very much for allowing me to participate in the care of this patient.  If you have any questions regarding my recommendations, please do not hesitate to contact me.       Abbe Reynolds MD      Professor of Medicine  [employer]      Executive Medical Director, Solid Organ Transplant Program  [facility]

## 2024-10-21 NOTE — NURSING NOTE
Current patient location: [address] [place] [id number]    Is the patient currently in the state of [place]? YES    Visit mode:VIDEO    If the visit is dropped, the patient can be reconnected by: VIDEO VISIT: Text to cell phone:   Telephone Information:   Mobile [phone]       Will anyone else be joining the visit? NO  (If patient encounters technical issues they should call [phone]    Are changes needed to the allergy or medication list? Yes      Are refills needed on medications prescribed by this physician? NO    Rooming Documentation:  Not applicable    Reason for visit: Consult (New Urology - Rectocele [N81.6)    Jennifer COURTNEY

## 2024-10-21 NOTE — PROGRESS NOTES
Virtual Visit Details    Type of service:  Video Visit   Video Start Time: 11:42 AM  Video End Time:11:51 AM    Originating Location (pt. Location): Home    Distant Location (provider location):  Off-site  Platform used for Video Visit: Makayla

## 2024-10-23 ENCOUNTER — TELEPHONE (OUTPATIENT)
Dept: UROLOGY | Facility: CLINIC | Age: 64
End: 2024-10-23
Payer: COMMERCIAL

## 2024-10-23 NOTE — CONFIDENTIAL NOTE
Left Voicemail (1st Attempt) for the patient to call back and schedule the following:    Appointment type: return patient  Provider:   Return date: next available in clinic  Specialty phone number: [phone]  Additional appointment(s) needed:   Additonal Notes: follow up in clinic for exam.         Jennifer tellez Complex   Gastroenterology, Infectious Diseases, Nephrology, Pulmonology and Rheumatology Specialties  [facility]

## 2025-06-08 ENCOUNTER — HEALTH MAINTENANCE LETTER (OUTPATIENT)
Age: 65
End: 2025-06-08

## (undated) DEVICE — Device

## (undated) DEVICE — SOL WATER IRRIG 1000ML BOTTLE 2F7114

## (undated) DEVICE — PLATE GROUNDING ADULT W/CORD 9165L

## (undated) DEVICE — DEVICE CLOSURE V-LOC 0 GS-21 6IN VLOCN0306

## (undated) DEVICE — NDL INSUFFLATION 13GA 120MM C2201

## (undated) DEVICE — PREP CHLORAPREP 26ML TINTED HI-LITE ORANGE 930815

## (undated) DEVICE — BLADE KNIFE SURG 11 371111

## (undated) DEVICE — PAD POS XL 1X20X40IN PINK PIGAZZI

## (undated) DEVICE — DAVINCI XI SEAL UNIVERSAL 5-8MM 470361

## (undated) DEVICE — TUBE PENROSE 3/8 X 12 STRL LTX 0912020

## (undated) DEVICE — DRSG STERI STRIP 1/2X4" R1547

## (undated) DEVICE — SUTURE PASSOR W/GUIDE RSG-14F-4-WG

## (undated) DEVICE — SUTURE VICRYL+ 2-0 27IN SH UND VCP417H

## (undated) DEVICE — SYR 50ML SLIP TIP W/O NDL 309654

## (undated) DEVICE — DRESSING COVERLET STRIP 3/4 X 3 LF 230

## (undated) DEVICE — DAVINCI XI HANDPIECE ESU VESSEL SEALER 8MM EXT 480422

## (undated) DEVICE — DAVINCI XI DRAPE COLUMN 470341

## (undated) DEVICE — DRSG TEGADERM 2 3/8X2 3/4" 1624W

## (undated) DEVICE — TUBING SMOKE EVAC PNEUMOCLEAR HIGH FLOW 0620050250

## (undated) DEVICE — SU VICRYL+ 0 27 UR6 VLT VCP603H

## (undated) DEVICE — DRAPE SHEET REV FOLD 3/4 9349

## (undated) DEVICE — DAVINCI XI OBTURATOR BLADELESS 8MM 470359

## (undated) DEVICE — LUBRICANT INST ELECTROLUBE EL101

## (undated) DEVICE — NDL SPINAL 20GA 3.5"

## (undated) DEVICE — CUSTOM PACK LAP CHOLE SBA5BLCHEA

## (undated) DEVICE — GLOVE BIOGEL PI ORTHOPRO SZ 7.5 47675

## (undated) DEVICE — DECANTER BAG 2002S

## (undated) DEVICE — DRAPE U SPLIT 74X120" 29440

## (undated) DEVICE — DAVINCI XI REDUCER 8-12MM 470381

## (undated) DEVICE — SUCTION MANIFOLD NEPTUNE 2 SYS 1 PORT 702-025-000

## (undated) DEVICE — SOL NACL 0.9% 100ML BAG 2B1302

## (undated) DEVICE — SU ETHIBOND 0 CT-2 30" X412H

## (undated) DEVICE — SUTURE MONOCRYL+ 4-0 PS-2 27IN MCP426H

## (undated) DEVICE — DAVINCI XI DRAPE ARM 470015

## (undated) DEVICE — DAVINCI SEAL CANNULA AND STPL 12MM 470380

## (undated) DEVICE — DECANTER VIAL 2006S

## (undated) DEVICE — MARKER SURG SKIN STRL 77734

## (undated) RX ORDER — PROPOFOL 10 MG/ML
INJECTION, EMULSION INTRAVENOUS
Status: DISPENSED
Start: 2023-05-30

## (undated) RX ORDER — DEXAMETHASONE SODIUM PHOSPHATE 10 MG/ML
INJECTION, EMULSION INTRAMUSCULAR; INTRAVENOUS
Status: DISPENSED
Start: 2023-05-30

## (undated) RX ORDER — KETOROLAC TROMETHAMINE 30 MG/ML
INJECTION, SOLUTION INTRAMUSCULAR; INTRAVENOUS
Status: DISPENSED
Start: 2023-05-30

## (undated) RX ORDER — ONDANSETRON 2 MG/ML
INJECTION INTRAMUSCULAR; INTRAVENOUS
Status: DISPENSED
Start: 2023-05-30

## (undated) RX ORDER — FENTANYL CITRATE 50 UG/ML
INJECTION, SOLUTION INTRAMUSCULAR; INTRAVENOUS
Status: DISPENSED
Start: 2023-05-30

## (undated) RX ORDER — FENTANYL CITRATE-0.9 % NACL/PF 10 MCG/ML
PLASTIC BAG, INJECTION (ML) INTRAVENOUS
Status: DISPENSED
Start: 2023-05-30

## (undated) RX ORDER — LIDOCAINE HYDROCHLORIDE 10 MG/ML
INJECTION, SOLUTION EPIDURAL; INFILTRATION; INTRACAUDAL; PERINEURAL
Status: DISPENSED
Start: 2023-05-30